# Patient Record
Sex: MALE | Race: WHITE | NOT HISPANIC OR LATINO | Employment: UNEMPLOYED | URBAN - METROPOLITAN AREA
[De-identification: names, ages, dates, MRNs, and addresses within clinical notes are randomized per-mention and may not be internally consistent; named-entity substitution may affect disease eponyms.]

---

## 2017-02-09 DIAGNOSIS — Q23.4 HYPOPLASTIC LEFT HEART SYNDROME: Primary | ICD-10-CM

## 2017-02-09 RX ORDER — DIGOXIN 125 MCG
125 TABLET ORAL 2 TIMES DAILY
Qty: 60 TABLET | Refills: 2 | Status: SHIPPED | OUTPATIENT
Start: 2017-02-09 | End: 2017-03-20

## 2017-02-09 RX ORDER — LISINOPRIL 10 MG/1
10 TABLET ORAL DAILY
Qty: 30 TABLET | Refills: 2 | Status: SHIPPED | OUTPATIENT
Start: 2017-02-09 | End: 2017-03-20

## 2017-03-20 ENCOUNTER — OFFICE VISIT (OUTPATIENT)
Dept: PEDIATRIC CARDIOLOGY | Facility: CLINIC | Age: 22
End: 2017-03-20

## 2017-03-20 VITALS
HEIGHT: 65 IN | SYSTOLIC BLOOD PRESSURE: 107 MMHG | WEIGHT: 104.94 LBS | RESPIRATION RATE: 18 BRPM | DIASTOLIC BLOOD PRESSURE: 62 MMHG | HEART RATE: 89 BPM | OXYGEN SATURATION: 92 % | BODY MASS INDEX: 17.48 KG/M2

## 2017-03-20 DIAGNOSIS — Q23.4 HYPOPLASTIC LEFT HEART SYNDROME: ICD-10-CM

## 2017-03-20 LAB — INTERPRETATION ECG - MUSE: NORMAL

## 2017-03-20 RX ORDER — DIGOXIN 125 MCG
125 TABLET ORAL 2 TIMES DAILY
Qty: 60 TABLET | Refills: 11 | Status: SHIPPED | OUTPATIENT
Start: 2017-03-20 | End: 2018-03-19

## 2017-03-20 RX ORDER — LISINOPRIL 10 MG/1
15 TABLET ORAL DAILY
Qty: 45 TABLET | Refills: 11 | Status: SHIPPED | OUTPATIENT
Start: 2017-03-20 | End: 2018-01-31

## 2017-03-20 ASSESSMENT — PAIN SCALES - GENERAL: PAINLEVEL: NO PAIN (0)

## 2017-03-20 NOTE — NURSING NOTE
"Chief Complaint   Patient presents with     Heart Problem     Follow-up on Single Ventricle and Fontan.       Initial /62 (BP Location: Right arm, Patient Position: Chair, Cuff Size: Adult Regular)  Pulse 89  Resp 18  Ht 5' 4.96\" (165 cm)  Wt 104 lb 15 oz (47.6 kg)  SpO2 92%  BMI 17.48 kg/m2 Estimated body mass index is 17.48 kg/(m^2) as calculated from the following:    Height as of this encounter: 5' 4.96\" (165 cm).    Weight as of this encounter: 104 lb 15 oz (47.6 kg).  Medication Reconciliation: complete  "

## 2017-03-20 NOTE — PROGRESS NOTES
[facility] Clinic Note             Assessment and Plan:     Carlin is a 21 year old male with     IMP: Carlin Stern is a 20 year old male with history of HLHS, s/p Non-Fenestrated Fontan procedure with developmental delay, currently asymptomatic, growing well.  Nose bleeds- he picks on his nose.   O 2 sat 92%.    PLAN:    F/U in July 2018 with O 2 sat, Echo, EKG  No Activity Restrictions  Adherence to heart healthy diet, regular exercise habits, avoidance of tobacco products and maintenance of a healthy weight  Continue     Aspirin 81 mg once daily    Digoxin 125 mcg BID    Lisinopril 15 mg once daily  - SBE Prophylaxis needed for dental procedures  - Results were reviewed with the family.      Patient Active Problem List   Diagnosis     Neuromuscular scoliosis of thoracolumbar region       Patient Active Problem List    Diagnosis     Neuromuscular scoliosis of thoracolumbar region             Attending Attestation:     Echocardiographic images were reviewed by me.           History of Present Illness:   I was asked to see this patient by Primary Care Provider Basilio Rahman to consult regarding single ventricle.  s/p Meagan, and s/p Non-fenestrated Fontan procedure.   Carlin was last seen on /2015. He has undergone a Me[place], Jamie and Fontan procedures at [facility] in [place]. Carlin was adopted at the age of [age] and since the last follow-up visit, according to the parents, he has done well.     He  underwent MRI on 4/8/15 which showed mild narrowing of the proximal LPA segment with pulmonary distribution of 45% to rt. Lung and 55% to left lung. With dilated RV - EDV 80ml/m2 with moderate hypertrophy and EF of 38%.  Last treadmill test performed on 2/13/13: Normal ventilatory effort, Peak Vo2 low 61% of predicted. Low peak heart rate -indicating chronotropic incompetence. Reduced cardiac capacity.  He is developmentally delayed. He  functions at the 3rd grade level although he is a alex currently in special Ed school. His hearing is good. His left eye vision is abnormal. His medications include Digoxin 125 micrograms twice daily, aspirin 81 mg once daily and Lisinopril 10 mg once daily. He has been active, can run and play with his friends, with no major illnesses or concerns. No major hospitalizations recently.His appetite is normal, sleeps well.     Last Echocardiogram - 2015, Status post [place] and Fontan procedures in a patient with hypoplastic left heart syndrome. Mild neoaortic insufficiency. Mild to moderate (2+) tricuspid insufficiency. The flow in the branch pulmonary arteries was not well seen. There is no evidence of fenestration. Normal/low normal systemic ventricular contractility.        Cardiac cath: Jan 2016, RV pressure 83/9, RV sat 95%, Fontan pressure 11 mm Hg, normal PVR and cardiac output. Fontan O 2 sat 73%. Normal flows across the branch pulmonary arteries.      Lab: Dec 2015, Hb- 13.9, pro BNP- 241, Troponin- less than 0.015, S.Cret 1.    I have reviewed past medical family and social history with the patient or family.    Past Medical History:   No Recent Hospitalizations  Hypoplastic left heart syndrome  S/p [place], Jamie, Non-fenestrated Fontan procedure  Developmental delay       Family and Social History:   No history of congenital heart disease  Non-contributory         Review of Systems:   A comprehensive Review of Systems was performed is negative other than noted in the HPI  CV and Pulm ROS  are neg  No RIOS, sob, cyanosis, edema, cough, wheeze, syncope, chest pain, palpitations          Medications:   I have reviewed this patient's current medications        Current Outpatient Prescriptions   Medication     digoxin (LANOXIN) 125 MCG tablet     lisinopril (PRINIVIL/ZESTRIL) 10 MG tablet     FLUoxetine (PROZAC) 10 MG capsule     aspirin 81 MG tablet     No current facility-administered medications for this  "visit.          Physical Exam:     Blood pressure 107/62, pulse 89, resp. rate 18, height 5' 4.96\" (165 cm), weight 104 lb 15 oz (47.6 kg), SpO2 92 %.        General - NAD, awake, alert   HEENT - NC/AT EOMI   Cardiac - RRR nl S1 and S2 heard, systolic murmur grade 1/6 LSB. No diastolic murmur No click, thrill or heave   Respiratory - Lungs clear, no rales   Abdominal - Liver at RCM   Extremity  Nl pulses in brachial and femoral areas, No Clubbing, Edema, Cyanosis   Skin - No rash   Neuro - Nl gait, posture, tone         Labs     EKG results:         EKG with today's visit V-rate of 82/min, sinus, RBBB.      Echocardiography today:  Results: Hypoplastic left heart syndrome. Patient has undergone [place], Jamie and Fontan operations. There is a large muscular ventricular septal defect. There is moderate right ventricular enlargement. The right ventricular function is qualitatively mildly depressed. Hypoplastic left ventricle. There is low velocity continuous color flow in the Jamie shunt. No inferior vena cava obstruction. Mild to moderate (2+) tricuspid valve insufficiency. There is trace insufficiency of the dejan-aortic valve. 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 There is normal mitral valve leaflet excursion.      Sincerely,    Shabnam Zee MD, AMEE  Pediatric Cardiologist    CC:   Copy to patient  KAITLINROB James  [address]  [facility] [address]  "

## 2017-03-20 NOTE — LETTER
3/20/2017      RE: Carlin Stern  [address]  [facility] [address]       [facility] Clinic Note             Assessment and Plan:     Carlin is a 21 year old male with     IMP: Carlin Stern is a 20 year old male with history of HLHS, s/p Non-Fenestrated Fontan procedure with developmental delay, currently asymptomatic, growing well.  Nose bleeds- he picks on his nose.   O 2 sat 92%.    PLAN:    F/U in July 2018 with O 2 sat, Echo, EKG  No Activity Restrictions  Adherence to heart healthy diet, regular exercise habits, avoidance of tobacco products and maintenance of a healthy weight  Continue     Aspirin 81 mg once daily    Digoxin 125 mcg BID    Lisinopril 15 mg once daily  - SBE Prophylaxis needed for dental procedures  - Results were reviewed with the family.      Patient Active Problem List   Diagnosis     Neuromuscular scoliosis of thoracolumbar region       Patient Active Problem List    Diagnosis     Neuromuscular scoliosis of thoracolumbar region             Attending Attestation:     Echocardiographic images were reviewed by me.           History of Present Illness:   I was asked to see this patient by Primary Care Provider Basilio Rahman to consult regarding single ventricle.  s/p Meagan, and s/p Non-fenestrated Fontan procedure.   Carlin was last seen on /2015. He has undergone a Me[place], Jamie and Fontan procedures at [facility] in [place]. Carlin was adopted at the age of [age] and since the last follow-up visit, according to the parents, he has done well.     He  underwent MRI on 4/8/15 which showed mild narrowing of the proximal LPA segment with pulmonary distribution of 45% to rt. Lung and 55% to left lung. With dilated RV - EDV 80ml/m2 with moderate hypertrophy and EF of 38%.  Last treadmill test performed on 2/13/13: Normal ventilatory effort, Peak Vo2 low 61% of predicted. Low peak heart rate -indicating  chronotropic incompetence. Reduced cardiac capacity.  He is developmentally delayed. He functions at the 3rd grade level although he is a alex currently in special Ed school. His hearing is good. His left eye vision is abnormal. His medications include Digoxin 125 micrograms twice daily, aspirin 81 mg once daily and Lisinopril 10 mg once daily. He has been active, can run and play with his friends, with no major illnesses or concerns. No major hospitalizations recently.His appetite is normal, sleeps well.     Last Echocardiogram - 2015, Status post [place] and Fontan procedures in a patient with hypoplastic left heart syndrome. Mild neoaortic insufficiency. Mild to moderate (2+) tricuspid insufficiency. The flow in the branch pulmonary arteries was not well seen. There is no evidence of fenestration. Normal/low normal systemic ventricular contractility.        Cardiac cath: Jan 2016, RV pressure 83/9, RV sat 95%, Fontan pressure 11 mm Hg, normal PVR and cardiac output. Fontan O 2 sat 73%. Normal flows across the branch pulmonary arteries.      Lab: Dec 2015, Hb- 13.9, pro BNP- 241, Troponin- less than 0.015, S.Cret 1.    I have reviewed past medical family and social history with the patient or family.    Past Medical History:   No Recent Hospitalizations  Hypoplastic left heart syndrome  S/p [place], Jamie, Non-fenestrated Fontan procedure  Developmental delay       Family and Social History:   No history of congenital heart disease  Non-contributory         Review of Systems:   A comprehensive Review of Systems was performed is negative other than noted in the HPI  CV and Pulm ROS  are neg  No RIOS, sob, cyanosis, edema, cough, wheeze, syncope, chest pain, palpitations          Medications:   I have reviewed this patient's current medications        Current Outpatient Prescriptions   Medication     digoxin (LANOXIN) 125 MCG tablet     lisinopril (PRINIVIL/ZESTRIL) 10 MG tablet     FLUoxetine (PROZAC) 10 MG  "capsule     aspirin 81 MG tablet     No current facility-administered medications for this visit.          Physical Exam:     Blood pressure 107/62, pulse 89, resp. rate 18, height 5' 4.96\" (165 cm), weight 104 lb 15 oz (47.6 kg), SpO2 92 %.        General - NAD, awake, alert   HEENT - NC/AT EOMI   Cardiac - RRR nl S1 and S2 heard, systolic murmur grade 1/6 LSB. No diastolic murmur No click, thrill or heave   Respiratory - Lungs clear, no rales   Abdominal - Liver at RCM   Extremity  Nl pulses in brachial and femoral areas, No Clubbing, Edema, Cyanosis   Skin - No rash   Neuro - Nl gait, posture, tone         Labs     EKG results:         EKG with today's visit V-rate of 82/min, sinus, RBBB.      Echocardiography today:  Results: Hypoplastic left heart syndrome. Patient has undergone [place], Jamie and Fontan operations. There is a large muscular ventricular septal defect. There is moderate right ventricular enlargement. The right ventricular function is qualitatively mildly depressed. Hypoplastic left ventricle. There is low velocity continuous color flow in the Jamie shunt. No inferior vena cava obstruction. Mild to moderate (2+) tricuspid valve insufficiency. There is trace insufficiency of the dejan-aortic valve. 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 There is normal mitral valve leaflet excursion.      Sincerely,    Shabnam Zee MD, [facility] Center[facility]  Pediatric Cardiologist    CC:   Copy to patient  ROB MADRIGAL James  [address]  [id number]    "

## 2017-03-20 NOTE — MR AVS SNAPSHOT
After Visit Summary   3/20/2017    Carlin Stern    MRN: [id number]           Patient Information     Date Of Birth          1995        Visit Information        Provider Department      3/20/2017 3:30 PM Shabnam Zee MD [facility]        Today's Diagnoses     Hypoplastic left heart syndrome          Care Instructions    [facility]  Pediatric Specialty Clinic [place]      Pediatric Call Center [phone]  Kerline Tovar RN Care Coordinator:  [phone]    After Hours Emergency:  [phone].  Ask for the on-call doctor for the specialty you are calling for be paged.    Prescription Renewals:  Your pharmacy must fax requests to [phone].  Please allow 2-3 days for prescriptions to be authorized.    If your physician has ordered an x-ray or MRI, you may schedule this test by calling [facility]iology in [place] at [phone].          Follow-ups after your visit        Your next 10 appointments already scheduled     Mar 20, 2017  3:30 PM CDT   Return Visit with Shabnam Zee MD   [facility] ([facility])    [address] [place] [address]  [address]  [facility] [address] [phone]              Who to contact     Please call your clinic at [phone] to:    Ask questions about your health    Make or cancel appointments    Discuss your medicines    Learn about your test results    Speak to your doctor   If you have compliments or concerns about an experience at your clinic, or if you wish to file a complaint, please contact [facility] Patient Relations at [phone] or email us at [email or web address]         Additional Information About Your Visit        MyChart Information     [employer] is an electronic gateway that provides easy, online access to your medical records. With [employer], you can request a clinic  "appointment, read your test results, renew a prescription or communicate with your care team.     To sign up for [employer] visit the website at [email or web address]   You will be asked to enter the access code listed below, as well as some personal information. Please follow the directions to create your username and password.     Your access code is: [email or web address]  Expires: 2017  3:07 PM     Your access code will  in 90 days. If you need help or a new code, please contact your [facility] or call [phone] for assistance.        Care EveryWhere ID     This is your Care EveryWhere ID. This could be used by other organizations to access your [place] medical records  [id number]        Your Vitals Were     Pulse Respirations Height Pulse Oximetry BMI (Body Mass Index)       89 18 5' 4.96\" (165 cm) 92% 17.48 kg/m2        Blood Pressure from Last 3 Encounters:   17 107/62   16 113/58   12/07/15 96/67    Weight from Last 3 Encounters:   17 104 lb 15 oz (47.6 kg) (<1 %)*   16 102 lb 14.4 oz (46.7 kg) (<1 %)*   16 105 lb 13.1 oz (48 kg) (<1 %)*     * Growth percentiles are based on [facility] 2-20 Years data.              We Performed the Following     EKG 12-lead complete w/read - Same Day          Today's Medication Changes          These changes are accurate as of: 3/20/17  3:07 PM.  If you have any questions, ask your nurse or doctor.               These medicines have changed or have updated prescriptions.        Dose/Directions    digoxin 125 MCG tablet   Commonly known as:  LANOXIN   This may have changed:  additional instructions   Used for:  Hypoplastic left heart syndrome        Dose:  125 mcg   Take 1 tablet (125 mcg) by mouth 2 times daily Must see Dr. Haque for additional refills   Quantity:  60 tablet   Refills:  2                Primary Care Provider Office Phone # Fax #    Basilio Rahman [phone]       [facility] [address] [address]        Thank you!     Thank you for choosing [facility]  for your care. Our goal is always to provide you with excellent care. Hearing back from our patients is one way we can continue to improve our services. Please take a few minutes to complete the written survey that you may receive in the mail after your visit with us. Thank you!             Your Updated Medication List - Protect others around you: Learn how to safely use, store and throw away your medicines at www.disposemymeds.org.          This list is accurate as of: 3/20/17  3:07 PM.  Always use your most recent med list.                   Brand Name Dispense Instructions for use    aspirin 81 MG tablet     60 tablet    Take 1 tablet (81 mg) by mouth daily       digoxin 125 MCG tablet    LANOXIN    60 tablet    Take 1 tablet (125 mcg) by mouth 2 times daily Must see Dr. Haque for additional refills       lisinopril 10 MG tablet    PRINIVIL/ZESTRIL    30 tablet    Take 1 tablet (10 mg) by mouth daily Must see Dr. Haque for additional refills       PROZAC 10 MG capsule   Generic drug:  FLUoxetine      Take 20 mg by mouth daily

## 2017-03-20 NOTE — PATIENT INSTRUCTIONS
[facility]  Pediatric Specialty Clinic [place]      Pediatric Call Center [phone]  Kerline Tovar RN Care Coordinator:  [phone]    After Hours Emergency:  [phone].  Ask for the on-call doctor for the specialty you are calling for be paged.    Prescription Renewals:  Your pharmacy must fax requests to [phone].  Please allow 2-3 days for prescriptions to be authorized.    If your physician has ordered an x-ray or MRI, you may schedule this test by calling [facility] in [place] at [phone].

## 2017-07-18 ENCOUNTER — CARE COORDINATION (OUTPATIENT)
Dept: PEDIATRIC CARDIOLOGY | Facility: CLINIC | Age: 22
End: 2017-07-18

## 2017-07-18 NOTE — PROGRESS NOTES
Received a request from [employer] to send in a prior authorization for digoxin.  Sent in the required paperwork and it was approved as of 7/18/17.

## 2018-01-31 DIAGNOSIS — Q23.4 HYPOPLASTIC LEFT HEART SYNDROME: ICD-10-CM

## 2018-01-31 RX ORDER — LISINOPRIL 10 MG/1
15 TABLET ORAL DAILY
Qty: 45 TABLET | Refills: 6 | Status: SHIPPED | OUTPATIENT
Start: 2018-01-31 | End: 2018-03-19

## 2018-02-28 ENCOUNTER — TELEPHONE (OUTPATIENT)
Dept: PEDIATRIC CARDIOLOGY | Facility: CLINIC | Age: 23
End: 2018-02-28

## 2018-02-28 DIAGNOSIS — Q23.4 HYPOPLASTIC LEFT HEART SYNDROME: Primary | ICD-10-CM

## 2018-02-28 NOTE — TELEPHONE ENCOUNTER
Called and left a message.  Dr. Haque would like to see Carlin back in clinic in March, prior to 4/1 when his insurance does not allow him to come here.  Mom should call their insurance and see if they cover Adult Congenital Cardiology Providers at [facility] and if not, what other providers are covered for him.  She should bring that information to their upcoming appt. Dr. Haque would like an echo and ekg at that appointment.     Left the scheduling number as well as the nurse triage number if they have any other questions or concerns.    Kerline Tovar RN Care Coordinator  [facility]

## 2018-02-28 NOTE — TELEPHONE ENCOUNTER
----- Message from Jeannette Evangelista RN sent at 2/27/2018 12:51 PM CST -----  Regarding: FW: referral      ----- Message -----     From: Darcy Monson     Sent: 2/27/2018  12:45 PM       To: p Peds Cardiology Rn Team - [facility]  Subject: referral                                         Is an  Needed: no  If yes, Which Language:    Callers Name: Neo Summers Phone Number: [phone]  Relationship to Patient: mom  Best time of day to call: any  Is it ok to leave a detailed voicemail on this number: yes  Reason for Call: as of 4-1 we are not longer accepting this pt's insurance. Can you please advise for another provider who would be a good recommendation. Please reach out.

## 2018-03-19 ENCOUNTER — OFFICE VISIT (OUTPATIENT)
Dept: PEDIATRIC CARDIOLOGY | Facility: CLINIC | Age: 23
End: 2018-03-19
Payer: MEDICARE

## 2018-03-19 ENCOUNTER — RADIANT APPOINTMENT (OUTPATIENT)
Dept: CARDIOLOGY | Facility: CLINIC | Age: 23
End: 2018-03-19
Payer: MEDICARE

## 2018-03-19 VITALS
BODY MASS INDEX: 17.92 KG/M2 | HEART RATE: 80 BPM | SYSTOLIC BLOOD PRESSURE: 103 MMHG | DIASTOLIC BLOOD PRESSURE: 56 MMHG | WEIGHT: 107.58 LBS | OXYGEN SATURATION: 97 % | HEIGHT: 65 IN

## 2018-03-19 DIAGNOSIS — Q23.4 HYPOPLASTIC LEFT HEART SYNDROME: ICD-10-CM

## 2018-03-19 LAB — INTERPRETATION ECG - MUSE: NORMAL

## 2018-03-19 RX ORDER — DIGOXIN 125 MCG
125 TABLET ORAL 2 TIMES DAILY
Qty: 60 TABLET | Refills: 11 | Status: SHIPPED | OUTPATIENT
Start: 2018-03-19 | End: 2019-03-06

## 2018-03-19 RX ORDER — LISINOPRIL 10 MG/1
15 TABLET ORAL DAILY
Qty: 45 TABLET | Refills: 6 | Status: SHIPPED | OUTPATIENT
Start: 2018-03-19 | End: 2019-02-12

## 2018-03-19 ASSESSMENT — PAIN SCALES - GENERAL: PAINLEVEL: NO PAIN (0)

## 2018-03-19 NOTE — LETTER
3/19/2018      RE: Carlin Stern  [address]  [facility] [address]       [facility] Note             Assessment and Plan:     Carlin is a 22 year old male with     IMP: Carlin Stern is a 22 year old male with history of HLHS, s/p Non-Fenestrated Fontan procedure with developmental delay, currently asymptomatic, growing well. No concerns.  O 2 sat 97%.    PLAN:    F/U in July 2019 with O 2 sat, Echo, EKG  No Activity Restrictions  Adherence to heart healthy diet, regular exercise habits, avoidance of tobacco products and maintenance of a healthy weight  Continue     Aspirin 81 mg once daily    Digoxin 125 mcg BID    Lisinopril 15 mg once daily  - SBE Prophylaxis needed for dental procedures  - Results were reviewed with the family.      Patient Active Problem List   Diagnosis     Neuromuscular scoliosis of thoracolumbar region       Patient Active Problem List    Diagnosis     Neuromuscular scoliosis of thoracolumbar region             Attending Attestation:     Echocardiographic images were reviewed by me.           History of Present Illness:   I was asked to see this patient by Primary Care Provider Basilio Rahman to consult regarding single ventricle.  s/p [place], and s/p Non-fenestrated Fontan procedure.   Carlin was last seen on /2015. He has undergone a [place], Jamie and Fontan procedures at [facility] in [place]. Carlin was adopted at the age of [age] and since the last follow-up visit, according to the parents, he has done well.     He  underwent MRI on 4/8/15 which showed mild narrowing of the proximal LPA segment with pulmonary distribution of 45% to rt. Lung and 55% to left lung. With dilated RV - EDV 80ml/m2 with moderate hypertrophy and EF of 38%.  Last treadmill test performed on 2/13/13: Normal ventilatory effort, Peak Vo2 low 61% of predicted. Low peak heart rate -indicating chronotropic incompetence.  Reduced cardiac capacity.  He is developmentally delayed. He functions at the 3rd grade level although he is a senior currently in special Ed school. His hearing is good. His left eye vision is abnormal. His medications include Digoxin 125 micrograms twice daily, aspirin 81 mg once daily and Lisinopril 10 mg once daily. He has been active, can run and play with his friends, with no major illnesses or concerns. No major hospitalizations recently.His appetite is normal, sleeps well.     Last Echocardiogram - Results: Hypoplastic left heart syndrome. Patient has undergone [place], Jamie and Fontan operations. There is a large muscular ventricular septal defect. There is moderate right ventricular enlargement. The right ventricular function is qualitatively mildly depressed. Hypoplastic left ventricle. There is low velocity continuous color flow in the Jamie shunt. No inferior vena cava obstruction. Mild to moderate (2+) tricuspid valve insufficiency. There is trace insufficiency of the dejan-aortic valve. 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 There is normal mitral valve leaflet excursion.     Cardiac cath: Jan 2016, RV pressure 83/9, RV sat 95%, Fontan pressure 11 mm Hg, normal PVR and cardiac output. Fontan O 2 sat 73%. Normal flows across the branch pulmonary arteries.      Lab: Dec 2015, Hb- 13.9, pro BNP- 241, Troponin- less than 0.015, S.Cret 1.    I have reviewed past medical family and social history with the patient or family.    Past Medical History:   No Recent Hospitalizations  Hypoplastic left heart syndrome  S/p [place], Jamie, Non-fenestrated Fontan procedure  Developmental delay       Family and Social History:   No history of congenital heart disease  Non-contributory         Review of Systems:   A comprehensive Review of Systems was performed is negative other than noted in the HPI  CV and Pulm ROS  are neg  No RIOS, sob, cyanosis, edema, cough, wheeze, syncope, chest pain, palpitations           "Medications:   I have reviewed this patient's current medications        Current Outpatient Prescriptions   Medication     sertraline (ZOLOFT) 50 MG tablet     lisinopril (PRINIVIL/ZESTRIL) 10 MG tablet     digoxin (LANOXIN) 125 MCG tablet     aspirin 81 MG tablet     No current facility-administered medications for this visit.          Physical Exam:     Blood pressure 103/56, pulse 80, height 1.66 m (5' 5.35\"), weight 48.8 kg (107 lb 9.4 oz), SpO2 97 %.        General - NAD, awake, alert   HEENT - NC/AT EOMI   Cardiac - RRR nl S1 and S2 heard, systolic murmur grade 1/6 LSB. Diastolic murmur grade 1/6 No click, thrill or heave   Respiratory - Lungs clear, no rales   Abdominal - Liver at RCM   Extremity  Nl pulses in brachial and femoral areas, No Clubbing, Edema, Cyanosis   Skin - No rash   Neuro - Nl gait, posture, tone         Labs     EKG results:         EKG with today's visit V-rate of 72/min, sinus, RBBB.  msec.      Echocardiography today:Hypoplastic left heart syndrome. Patient has undergone [place], Jamie and Fontan operations.There is a large muscular ventricular septal defect. There is moderate right ventricular enlargement. The right ventricular function is qualitatively mildly depressed. Hypoplastic left ventricle. There is low velocity continuous color flow in the Jamie shunt. No inferior vena cava obstruction. Mild to moderate (2+) tricuspid valve insufficiency. There is trace insufficiency of the dejan-aortic valve. 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 There is normal mitral valve leaflet excursion.      Sincerely,    Shabnam Zee MD, [facility]  Pediatric Cardiologist      Copy to patient  KAITLINROB James  [address]  [facility] [address]      "

## 2018-03-19 NOTE — PROGRESS NOTES
[facility] Note             Assessment and Plan:     Carlin is a 22 year old male with     IMP: Carlin Stern is a 22 year old male with history of HLHS, s/p Non-Fenestrated Fontan procedure with developmental delay, currently asymptomatic, growing well. No concerns.  O 2 sat 97%.    PLAN:    F/U in July 2019 with O 2 sat, Echo, EKG  No Activity Restrictions  Adherence to heart healthy diet, regular exercise habits, avoidance of tobacco products and maintenance of a healthy weight  Continue     Aspirin 81 mg once daily    Digoxin 125 mcg BID    Lisinopril 15 mg once daily  - SBE Prophylaxis needed for dental procedures  - Results were reviewed with the family.      Patient Active Problem List   Diagnosis     Neuromuscular scoliosis of thoracolumbar region       Patient Active Problem List    Diagnosis     Neuromuscular scoliosis of thoracolumbar region             Attending Attestation:     Echocardiographic images were reviewed by me.           History of Present Illness:   I was asked to see this patient by Primary Care Provider Basilio Rahman to consult regarding single ventricle.  s/p [place], and s/p Non-fenestrated Fontan procedure.   Carlin was last seen on /2015. He has undergone a [place], Jamie and Fontan procedures at [facility] in [place]. Carlin was adopted at the age of [age] and since the last follow-up visit, according to the parents, he has done well.     He  underwent MRI on 4/8/15 which showed mild narrowing of the proximal LPA segment with pulmonary distribution of 45% to rt. Lung and 55% to left lung. With dilated RV - EDV 80ml/m2 with moderate hypertrophy and EF of 38%.  Last treadmill test performed on 2/13/13: Normal ventilatory effort, Peak Vo2 low 61% of predicted. Low peak heart rate -indicating chronotropic incompetence. Reduced cardiac capacity.  He is developmentally delayed. 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 His hearing is good. His left eye vision is abnormal. His medications include Digoxin 125 micrograms twice daily, aspirin 81 mg once daily and Lisinopril 10 mg once daily. He has been active, can run and play with his friends, with no major illnesses or concerns. No major hospitalizations recently.His appetite is normal, sleeps well.     Last Echocardiogram - Results: Hypoplastic left heart syndrome. Patient has undergone [place], Jamie and Fontan operations. There is a large muscular ventricular septal defect. There is moderate right ventricular enlargement. The right ventricular function is qualitatively mildly depressed. Hypoplastic left ventricle. There is low velocity continuous color flow in the Jamie shunt. No inferior vena cava obstruction. Mild to moderate (2+) tricuspid valve insufficiency. There is trace insufficiency of the dejan-aortic valve. 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 There is normal mitral valve leaflet excursion.     Cardiac cath: Jan 2016, RV pressure 83/9, RV sat 95%, Fontan pressure 11 mm Hg, normal PVR and cardiac output. Fontan O 2 sat 73%. Normal flows across the branch pulmonary arteries.      Lab: Dec 2015, Hb- 13.9, pro BNP- 241, Troponin- less than 0.015, S.Cret 1.    I have reviewed past medical family and social history with the patient or family.    Past Medical History:   No Recent Hospitalizations  Hypoplastic left heart syndrome  S/p [place], Jamie, Non-fenestrated Fontan procedure  Developmental delay       Family and Social History:   No history of congenital heart disease  Non-contributory         Review of Systems:   A comprehensive Review of Systems was performed is negative other than noted in the HPI  CV and Pulm ROS  are neg  No RIOS, sob, cyanosis, edema, cough, wheeze, syncope, chest pain, palpitations          Medications:   I have reviewed this patient's current medications        Current Outpatient  "Prescriptions   Medication     sertraline (ZOLOFT) 50 MG tablet     lisinopril (PRINIVIL/ZESTRIL) 10 MG tablet     digoxin (LANOXIN) 125 MCG tablet     aspirin 81 MG tablet     No current facility-administered medications for this visit.          Physical Exam:     Blood pressure 103/56, pulse 80, height 1.66 m (5' 5.35\"), weight 48.8 kg (107 lb 9.4 oz), SpO2 97 %.        General - NAD, awake, alert   HEENT - NC/AT EOMI   Cardiac - RRR nl S1 and S2 heard, systolic murmur grade 1/6 LSB. Diastolic murmur grade 1/6 No click, thrill or heave   Respiratory - Lungs clear, no rales   Abdominal - Liver at RCM   Extremity  Nl pulses in brachial and femoral areas, No Clubbing, Edema, Cyanosis   Skin - No rash   Neuro - Nl gait, posture, tone         Labs     EKG results:         EKG with today's visit V-rate of 72/min, sinus, RBBB.  msec.      Echocardiography today:Hypoplastic left heart syndrome. Patient has undergone [place], Jamie and Fontan operations.There is a large muscular ventricular septal defect. There is moderate right ventricular enlargement. The right ventricular function is qualitatively mildly depressed. Hypoplastic left ventricle. There is low velocity continuous color flow in the [place] shunt. No inferior vena cava obstruction. Mild to moderate (2+) tricuspid valve insufficiency. There is trace insufficiency of the dejan-aortic valve. 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 There is normal mitral valve leaflet excursion.        Sincerely,    Shabnam Zee MD, [facility]  Pediatric Cardiologist    CC:   Copy to patient  ROB MADRIGAL James  [address]  [facility] [address]  "

## 2018-03-19 NOTE — MR AVS SNAPSHOT
"              After Visit Summary   3/19/2018    Carlin Stern    MRN: [id number]           Patient Information     Date Of Birth          1995        Visit Information        Provider Department      3/19/2018 12:30 PM Shabnam Zee MD [facility]        Today's Diagnoses     Hypoplastic left heart syndrome          Care Instructions    [facility]  Pediatric Specialty Clinic [place]      Pediatric Call Center [phone], option 1  Kerline Tovar RN Care Coordinator:  [phone]    After Hours Emergency:  [phone].  Ask for the on-call pediatric doctor for the specialty you are calling for be paged.    Prescription Renewals:  Your pharmacy must fax requests to [phone].  Please allow 2-3 days for prescriptions to be authorized.    If your physician has ordered an CT or MRI, you may schedule this test by calling [facility] in [place] at [phone].            Follow-ups after your visit        Follow-up notes from your care team     Return in about 16 months (around 7/15/2019) for Physical Exam, Echo, EKG, O 2 saturation.      Your next 10 appointments already scheduled     Mar 19, 2018 12:30 PM CDT   Return Visit with Shabnam Zee MD   [facility] ([facility])    [address] [place] [address]  [address]  [facility] [address] [phone]              Who to contact     Please call your clinic at [phone] to:    Ask questions about your health    Make or cancel appointments    Discuss your medicines    Learn about your test results    Speak to your doctor            Additional Information About Your Visit        Care EveryWhere ID     This is your Care EveryWhere ID. This could be used by other organizations to access your [place] medical records  [id number]        Your Vitals Were     Pulse Height Pulse Oximetry BMI (Body Mass Index)          80 5' 5.35\" " (166 cm) 97% 17.71 kg/m2         Blood Pressure from Last 3 Encounters:   03/19/18 103/56   03/20/17 107/62   03/11/16 113/58    Weight from Last 3 Encounters:   03/19/18 107 lb 9.4 oz (48.8 kg)   03/20/17 104 lb 15 oz (47.6 kg) (<1 %)*   04/07/16 102 lb 14.4 oz (46.7 kg) (<1 %)*     * Growth percentiles are based on [facility] 2-20 Years data.              We Performed the Following     EKG 12-lead complete w/ read - Future          Today's Medication Changes          These changes are accurate as of 3/19/18 12:15 PM.  If you have any questions, ask your nurse or doctor.               Stop taking these medicines if you haven't already. Please contact your care team if you have questions.     PROZAC 10 MG capsule   Generic drug:  FLUoxetine   Stopped by:  Shabnam Zee MD                    Primary Care Provider Office Phone # Fax #    Basilio Rahman [phone]       [address] [address]        Equal Access to Services     [facility]: Hadii garcía Mann, caleb bonilla, thaddeus saba, gerardo pruitt . So [facility] [phone].    ATENCIÓN: Si habla español, tiene a sumner disposición servicios gratuitos de asistencia lingüística. [facility] [phone].    We comply with applicable federal civil rights laws and [place] laws. We do not discriminate on the basis of race, color, national origin, age, disability, sex, sexual orientation, or gender identity.            Thank you!     Thank you for choosing [facility]  for your care. Our goal is always to provide you with excellent care. Hearing back from our patients is one way we can continue to improve our services. Please take a few minutes to complete the written survey that you may receive in the mail after your visit with us. Thank you!             Your Updated Medication List - Protect others around you: Learn how to  safely use, store and throw away your medicines at www.disposemymeds.org.          This list is accurate as of 3/19/18 12:15 PM.  Always use your most recent med list.                   Brand Name Dispense Instructions for use Diagnosis    aspirin 81 MG tablet     60 tablet    Take 1 tablet (81 mg) by mouth daily    Hypoplastic left heart syndrome       digoxin 125 MCG tablet    LANOXIN    60 tablet    Take 1 tablet (125 mcg) by mouth 2 times daily Must see Dr. Haque for additional refills    Hypoplastic left heart syndrome       lisinopril 10 MG tablet    PRINIVIL/ZESTRIL    45 tablet    Take 1.5 tablets (15 mg) by mouth daily    Hypoplastic left heart syndrome       sertraline 50 MG tablet    ZOLOFT     Take 75 mg by mouth

## 2018-03-19 NOTE — NURSING NOTE
"Chief Complaint   Patient presents with     Heart Problem     Follow up       Initial /56 (BP Location: Right arm, Patient Position: Sitting, Cuff Size: Adult Regular)  Pulse 80  Ht 5' 5.35\" (166 cm)  Wt 107 lb 9.4 oz (48.8 kg)  SpO2 97%  BMI 17.71 kg/m2 Estimated body mass index is 17.71 kg/(m^2) as calculated from the following:    Height as of this encounter: 5' 5.35\" (166 cm).    Weight as of this encounter: 107 lb 9.4 oz (48.8 kg).  Medication Reconciliation: complete    "

## 2018-03-19 NOTE — PATIENT INSTRUCTIONS
[facility]  Pediatric Specialty Clinic [place]      Pediatric Call Center [phone], option 1  Kerline Tovar RN Care Coordinator:  [phone]    After Hours Emergency:  [phone].  Ask for the on-call pediatric doctor for the specialty you are calling for be paged.    Prescription Renewals:  Your pharmacy must fax requests to [phone].  Please allow 2-3 days for prescriptions to be authorized.    If your physician has ordered an CT or MRI, you may schedule this test by calling [facility] in [place] at [phone].

## 2018-12-14 DIAGNOSIS — Q23.4 HLHS (HYPOPLASTIC LEFT HEART SYNDROME): Primary | ICD-10-CM

## 2018-12-14 DIAGNOSIS — Z98.890 S/P FONTAN PROCEDURE: ICD-10-CM

## 2019-02-12 DIAGNOSIS — Q23.4 HYPOPLASTIC LEFT HEART SYNDROME: ICD-10-CM

## 2019-02-12 RX ORDER — LISINOPRIL 10 MG/1
15 TABLET ORAL DAILY
Qty: 45 TABLET | Refills: 6 | Status: SHIPPED | OUTPATIENT
Start: 2019-02-12 | End: 2019-03-06

## 2019-02-12 NOTE — TELEPHONE ENCOUNTER
Refilled per nursing protocol..  Patient due back in July to see Dr. Haque.    Kerline Tovar, RN Care Coordinator  [place] [facility]

## 2019-03-06 DIAGNOSIS — Q23.4 HYPOPLASTIC LEFT HEART SYNDROME: ICD-10-CM

## 2019-03-06 RX ORDER — LISINOPRIL 10 MG/1
15 TABLET ORAL DAILY
Qty: 45 TABLET | Refills: 4 | Status: SHIPPED | OUTPATIENT
Start: 2019-03-06 | End: 2019-08-26

## 2019-03-06 RX ORDER — DIGOXIN 125 MCG
125 TABLET ORAL 2 TIMES DAILY
Qty: 60 TABLET | Refills: 4 | Status: SHIPPED | OUTPATIENT
Start: 2019-03-06 | End: 2019-07-31

## 2019-03-06 NOTE — TELEPHONE ENCOUNTER
Refilled per nursing protocol.  Carlin is due back to be seen by Dr. Haque in July 2019.    Kerline Tovar, RN Care Coordinator  [place] [facility]

## 2019-04-26 DIAGNOSIS — Q23.4 HYPOPLASTIC LEFT HEART SYNDROME: ICD-10-CM

## 2019-04-26 NOTE — TELEPHONE ENCOUNTER
Refilled per nursing protocol.  Patient is due to see Dr. Haque again in July 2019.    Kerline Tovar, RN Care Coordinator  [place] [facility]

## 2019-07-31 DIAGNOSIS — Q23.4 HYPOPLASTIC LEFT HEART SYNDROME: ICD-10-CM

## 2019-07-31 RX ORDER — DIGOXIN 125 MCG
125 TABLET ORAL 2 TIMES DAILY
Qty: 60 TABLET | Refills: 1 | Status: SHIPPED | OUTPATIENT
Start: 2019-07-31 | End: 2019-08-26

## 2019-07-31 NOTE — TELEPHONE ENCOUNTER
Patient scheduled to see Dr. Haque on 8/26/19.  Refilled per nursing protocol.    Kerline Tovar, RN Care Coordinator  [place] [facility]

## 2019-08-26 ENCOUNTER — OFFICE VISIT (OUTPATIENT)
Dept: PEDIATRIC CARDIOLOGY | Facility: CLINIC | Age: 24
End: 2019-08-26
Payer: MEDICARE

## 2019-08-26 ENCOUNTER — ANCILLARY PROCEDURE (OUTPATIENT)
Dept: CARDIOLOGY | Facility: CLINIC | Age: 24
End: 2019-08-26
Payer: MEDICARE

## 2019-08-26 VITALS
WEIGHT: 110.89 LBS | BODY MASS INDEX: 18.48 KG/M2 | HEIGHT: 65 IN | SYSTOLIC BLOOD PRESSURE: 111 MMHG | OXYGEN SATURATION: 96 % | HEART RATE: 73 BPM | DIASTOLIC BLOOD PRESSURE: 64 MMHG

## 2019-08-26 DIAGNOSIS — Q23.4 HLHS (HYPOPLASTIC LEFT HEART SYNDROME): ICD-10-CM

## 2019-08-26 DIAGNOSIS — Z98.890 S/P FONTAN PROCEDURE: ICD-10-CM

## 2019-08-26 DIAGNOSIS — Q23.4 HYPOPLASTIC LEFT HEART SYNDROME: ICD-10-CM

## 2019-08-26 LAB
ALBUMIN SERPL-MCNC: 4.6 G/DL (ref 3.4–5)
ALP SERPL-CCNC: 76 U/L (ref 40–150)
ALT SERPL W P-5'-P-CCNC: 28 U/L (ref 0–70)
ANION GAP SERPL CALCULATED.3IONS-SCNC: 9 MMOL/L (ref 3–14)
AST SERPL W P-5'-P-CCNC: 23 U/L (ref 0–45)
BASOPHILS # BLD AUTO: 0 10E9/L (ref 0–0.2)
BASOPHILS NFR BLD AUTO: 0.7 %
BILIRUB SERPL-MCNC: 0.5 MG/DL (ref 0.2–1.3)
BUN SERPL-MCNC: 25 MG/DL (ref 7–30)
CALCIUM SERPL-MCNC: 9.4 MG/DL (ref 8.5–10.1)
CHLORIDE SERPL-SCNC: 101 MMOL/L (ref 94–109)
CO2 SERPL-SCNC: 25 MMOL/L (ref 20–32)
CREAT SERPL-MCNC: 1.06 MG/DL (ref 0.66–1.25)
DIFFERENTIAL METHOD BLD: ABNORMAL
EOSINOPHIL # BLD AUTO: 0.2 10E9/L (ref 0–0.7)
EOSINOPHIL NFR BLD AUTO: 5.4 %
ERYTHROCYTE [DISTWIDTH] IN BLOOD BY AUTOMATED COUNT: 13.2 % (ref 10–15)
GFR SERPL CREATININE-BSD FRML MDRD: >90 ML/MIN/{1.73_M2}
GLUCOSE SERPL-MCNC: 84 MG/DL (ref 70–99)
HCT VFR BLD AUTO: 46.6 % (ref 40–53)
HGB BLD-MCNC: 15.1 G/DL (ref 13.3–17.7)
IMM GRANULOCYTES # BLD: 0 10E9/L (ref 0–0.4)
IMM GRANULOCYTES NFR BLD: 0.2 %
INTERPRETATION ECG - MUSE: NORMAL
LYMPHOCYTES # BLD AUTO: 1 10E9/L (ref 0.8–5.3)
LYMPHOCYTES NFR BLD AUTO: 23.2 %
MCH RBC QN AUTO: 28.9 PG (ref 26.5–33)
MCHC RBC AUTO-ENTMCNC: 32.4 G/DL (ref 31.5–36.5)
MCV RBC AUTO: 89 FL (ref 78–100)
MONOCYTES # BLD AUTO: 0.6 10E9/L (ref 0–1.3)
MONOCYTES NFR BLD AUTO: 14.2 %
NEUTROPHILS # BLD AUTO: 2.5 10E9/L (ref 1.6–8.3)
NEUTROPHILS NFR BLD AUTO: 56.3 %
NRBC # BLD AUTO: 0 10*3/UL
NRBC BLD AUTO-RTO: 0 /100
NT-PROBNP SERPL-MCNC: 306 PG/ML (ref 0–125)
PLATELET # BLD AUTO: 78 10E9/L (ref 150–450)
POTASSIUM SERPL-SCNC: 4.3 MMOL/L (ref 3.4–5.3)
PROT SERPL-MCNC: 8 G/DL (ref 6.8–8.8)
RBC # BLD AUTO: 5.22 10E12/L (ref 4.4–5.9)
SODIUM SERPL-SCNC: 136 MMOL/L (ref 133–144)
WBC # BLD AUTO: 4.4 10E9/L (ref 4–11)

## 2019-08-26 RX ORDER — DIGOXIN 250 MCG
250 TABLET ORAL 2 TIMES DAILY
Qty: 45 TABLET | Refills: 4 | Status: SHIPPED | OUTPATIENT
Start: 2019-08-26 | End: 2020-01-06

## 2019-08-26 RX ORDER — SERTRALINE HYDROCHLORIDE 100 MG/1
100 TABLET, FILM COATED ORAL DAILY
Refills: 2 | COMMUNITY
Start: 2019-04-01

## 2019-08-26 RX ORDER — LISINOPRIL 20 MG/1
20 TABLET ORAL DAILY
Qty: 45 TABLET | Refills: 4 | Status: SHIPPED | OUTPATIENT
Start: 2019-08-26 | End: 2020-04-02

## 2019-08-26 ASSESSMENT — PAIN SCALES - GENERAL: PAINLEVEL: NO PAIN (0)

## 2019-08-26 ASSESSMENT — MIFFLIN-ST. JEOR: SCORE: 1433

## 2019-08-26 NOTE — PROGRESS NOTES
[facility] Note             Assessment and Plan:     Carlin is a 24 year old male with     IMP: Carlin Stern is a 24 year old male with history of HLHS, s/p Non-Fenestrated Fontan procedure with developmental delay. Having episodes of acrocyanosis at bedtime but having unchanged exercise tolerance. Will need more advanced imaging to assess branch pulmonary arteries and given intermittent episodes of desaturation would draw labs to assess possible chronic desaturations. O2 sat 96%.    PLAN:    F/U in August 2020 with O2 sat, Echo, EKG  Will order CT angio to assess branch pulmonary arteries and possible collaterals  Anticipate catheterization for possible coiling of collaterals  No Activity Restrictions  Adherence to heart healthy diet, regular exercise habits, avoidance of tobacco products and maintenance of a healthy weight  Will adjust medications    Aspirin 81 mg once daily    Digoxin 250 mcg BID    Lisinopril 20 mg once daily  - SBE Prophylaxis needed for dental procedures  - Results were reviewed with the family.  - Will obtain Lytes, LFTs, CBC and BNP today - Reviewed the results and were within normal limits.      Patient Active Problem List   Diagnosis     Neuromuscular scoliosis of thoracolumbar region       Patient Active Problem List    Diagnosis     Neuromuscular scoliosis of thoracolumbar region             Attending Attestation:     Echocardiographic images were reviewed by me.    History of Present Illness:   I was asked to see this patient by Primary Care Provider Basilio Rahman to consult regarding single ventricle.  s/p Meagan, and s/p Non-fenestrated Fontan procedure.   Carlin was last seen on /2015. He has undergone a Meagan, Jamie and Fontan procedures at [facility] in [place]. Carlin was adopted at the age of [age] and since the last follow-up visit, according to the parents, he has done well.     He  underwent  MRI on 4/8/15 which showed mild narrowing of the proximal LPA segment with pulmonary distribution of 45% to rt. Lung and 55% to left lung. With dilated RV - EDV 80ml/m2 with moderate hypertrophy and EF of 38%.  Last treadmill test performed on 2/13/13: Normal ventilatory effort, Peak Vo2 low 61% of predicted. Low peak heart rate -indicating chronotropic incompetence. Reduced cardiac capacity.    He is developmentally delayed. His hearing is good. His left eye vision is abnormal. His medications include Digoxin 125 micrograms twice daily, aspirin 81 mg once daily and Lisinopril 15 mg once daily. 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 Parents report that he does have acrocyanosis in his fingers at dinner time daily. He has otherwise not had any shortness of breath, chest pain, syncope or pre-syncope. No other major illnesses or concerns. No major hospitalizations recently. His appetite is normal, sleeps well.     Last Echocardiogram 3/19/18- Results: Hypoplastic left heart syndrome. Patient has undergone Meagan, Jamie and Fontan operations.There is a large muscular ventricular septal defect. There is moderate right ventricular enlargement. The right ventricular function is qualitatively mildly depressed. Hypoplastic left ventricle. There is low velocity continuous color flow in the Jamie shunt. No inferior vena cava obstruction. Mild to moderate (2+) tricuspid valve insufficiency. There is trace insufficiency of the dejan-aortic valve. 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 There is normal mitral valve leaflet excursion.    Cardiac cath: Jan 2016, RV pressure 83/9, RV sat 95%, Fontan pressure 11 mm Hg, normal PVR and cardiac output. Fontan O 2 sat 73%. Normal flows across the branch pulmonary arteries.    Lab: Dec 2015, Hb- 13.9, pro BNP- 241, Troponin- less than 0.015, S.Cret 1.    Lab- 2019- Hb 15.1, BUN-25, cret- 1.06, pro     I have reviewed past medical family and social history  "with the patient or family.    Past Medical History:   No Recent Hospitalizations  Hypoplastic left heart syndrome  S/p Meagan, Jamie, Non-fenestrated Fontan procedure  Developmental delay       Family and Social History:   No history of congenital heart disease  Non-contributory         Review of Systems:   A comprehensive Review of Systems was performed is negative other than noted in the HPI  CV and Pulm ROS  are neg  No RIOS, sob, cyanosis, edema, cough, wheeze, syncope, chest pain, palpitations          Medications:   I have reviewed this patient's current medications    Current Outpatient Medications   Medication     aspirin (ASA) 81 MG tablet     digoxin (LANOXIN) 125 MCG tablet     lisinopril (PRINIVIL/ZESTRIL) 10 MG tablet     sertraline (ZOLOFT) 100 MG tablet     No current facility-administered medications for this visit.          Physical Exam:     Blood pressure 111/64, pulse 73, height 1.656 m (5' 5.2\"), weight 50.3 kg (110 lb 14.3 oz), SpO2 96 %.    General - NAD, awake, alert   HEENT - NC/AT EOMI   Cardiac - RRR nl S1 and S2 heard, systolic murmur grade 1/6 LSB. Diastolic murmur grade 2/4 No click, thrill or heave   Respiratory - Lungs clear, no rales   Abdominal - Liver at RCM   Extremity  Nl pulses in brachial and femoral areas, No Clubbing, Edema, Cyanosis   Skin - No rash   Neuro - Nl gait, posture, tone         Labs     EKG results:      EKG with today's visit V-rate of 70/min, sinus, RBBB.  msec.      Echocardiography today:  Hypoplastic left heart syndrome. Patient has undergone Meagan, Jamie and Fontan operations. Large muscular ventricular septal defect with bidirectional flow. 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 Qualitatively mild/moderately depressed right ventricular systolic function. Normal appearance and motion of the systemic tricuspid valve, with moderate central regurgitation. Low-velocity phasic flow in the superior vena " cava, superior cavopulmonary anastamosis, inferior vena cava, and proximal Fontan conduit; inferior cavopulmonary anastamosis and branch pulmonary arteries not well seen. Unobstructed neoaortic outflow with mild insufficiency. Aortic arch not well-seen on 2D; normal prograde flow without increased diastolic continuation, and normal abdominal aortic flow profile. No effusion.     No significant change from last echocardiogram.    Plan of care discussed with Dr. Anju Hodges MD  Fellow, Pediatric Cardiology  Pager: [phone]    Patient Education: During this visit I discussed in detail the patient s symptoms, physical exam and evaluation results findings, tentative diagnosis as well as the treatment plan (Including but not limited to possible side effects and complications related to the disease, treatment modalities and intervention(s). Family expressed understanding and consent. Family was receptive and ready to learn; no apparent learning barriers were identified.    Sincerely,    Nicci Rene MD, [facility]  Pediatric Cardiologist    CC:   Copy to patient  ROB MADRIGAL Basilio Madrigal  [address] [facility] [address]    Attestation:  This patient has been seen and evaluated by me, Nicci Rene MD.  Discussed with the medical student, house staff team and/or resident(s) and agree with the findings and plan in this note.  I have reviewed today's vital signs, medications, labs and imaging.  Nicci Rene MD

## 2019-08-26 NOTE — PATIENT INSTRUCTIONS
[facility]  Pediatric Specialty Clinic [place]      Pediatric Call Center [phone], option 1  Kerline Tovar RN Care Coordinator:  [phone]    After Hours Needing Immediate Care:  [phone].  Ask for the on-call pediatric doctor for the specialty you are calling for be paged.  For dermatology urgent matters that cannot wait until the next business day, is over a holiday and/or a weekend please call [phone] and ask for the Dermatology Resident On-Call to be paged.    Prescription Renewals:  Please call your pharmacy first.  Your pharmacy must fax requests to [phone].  Please allow 2-3 days for prescriptions to be authorized.    If your physician has ordered a CT or MRI, you may schedule this test by calling [facility] in [place] at [phone].    **If your child is having a sedated procedure, they will need a history and physical done at their Primary Care Provider within 30 days of the procedure.  If your child was seen by the ordering provider in our office within 30 days of the procedure, their visit summary will work for the H&P unless they inform you otherwise.  If you have any questions, please call the RN Care Coordinator.**

## 2019-08-26 NOTE — LETTER
8/26/2019      RE: Carlin Stern  [address]  [facility] [address]       [facility] Note             Assessment and Plan:     Carlin is a 24 year old male with     IMP: Carlin Stern is a 24 year old male with history of HLHS, s/p Non-Fenestrated Fontan procedure with developmental delay. Having episodes of acrocyanosis at bedtime but having unchanged exercise tolerance. Will need more advanced imaging to assess branch pulmonary arteries and given intermittent episodes of desaturation would draw labs to assess possible chronic desaturations. O2 sat 96%.    PLAN:    F/U in August 2020 with O2 sat, Echo, EKG  Will order CT angio to assess branch pulmonary arteries and possible collaterals  Anticipate catheterization for possible coiling of collaterals  No Activity Restrictions  Adherence to heart healthy diet, regular exercise habits, avoidance of tobacco products and maintenance of a healthy weight  Will adjust medications    Aspirin 81 mg once daily    Digoxin 250 mcg BID    Lisinopril 20 mg once daily  - SBE Prophylaxis needed for dental procedures  - Results were reviewed with the family.  - Will obtain Lytes, LFTs, CBC and BNP today - Reviewed the results and were within normal limits.      Patient Active Problem List   Diagnosis     Neuromuscular scoliosis of thoracolumbar region       Patient Active Problem List    Diagnosis     Neuromuscular scoliosis of thoracolumbar region             Attending Attestation:     Echocardiographic images were reviewed by me.    History of Present Illness:   I was asked to see this patient by Primary Care Provider Basilio Rahman to consult regarding single ventricle.  s/p [place], and s/p Non-fenestrated Fontan procedure.   Carlin was last seen on /2015. He has undergone a [place], Jamie and Fontan procedures at [facility] in [place]. Carlin was adopted at the age of [age] and since the  last follow-up visit, according to the parents, he has done well.     He  underwent MRI on 4/8/15 which showed mild narrowing of the proximal LPA segment with pulmonary distribution of 45% to rt. Lung and 55% to left lung. With dilated RV - EDV 80ml/m2 with moderate hypertrophy and EF of 38%.  Last treadmill test performed on 2/13/13: Normal ventilatory effort, Peak Vo2 low 61% of predicted. Low peak heart rate -indicating chronotropic incompetence. Reduced cardiac capacity.    He is developmentally delayed. His hearing is good. His left eye vision is abnormal. His medications include Digoxin 125 micrograms twice daily, aspirin 81 mg once daily and Lisinopril 15 mg once daily. 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 Parents report that he does have acrocyanosis in his fingers at dinner time daily. He has otherwise not had any shortness of breath, chest pain, syncope or pre-syncope. No other major illnesses or concerns. No major hospitalizations recently. His appetite is normal, sleeps well.     Last Echocardiogram 3/19/18- Results: Hypoplastic left heart syndrome. Patient has undergone Meagan, Jamie and Fontan operations.There is a large muscular ventricular septal defect. There is moderate right ventricular enlargement. The right ventricular function is qualitatively mildly depressed. Hypoplastic left ventricle. There is low velocity continuous color flow in the Jamie shunt. No inferior vena cava obstruction. Mild to moderate (2+) tricuspid valve insufficiency. There is trace insufficiency of the dejan-aortic valve. 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 There is normal mitral valve leaflet excursion.    Cardiac cath: Jan 2016, RV pressure 83/9, RV sat 95%, Fontan pressure 11 mm Hg, normal PVR and cardiac output. Fontan O 2 sat 73%. Normal flows across the branch pulmonary arteries.    Lab: Dec 2015, Hb- 13.9, pro BNP- 241, Troponin- less than 0.015, S.Cret 1.    Lab- 2019- Hb 15.1,  "BUN-25, cret- 1.06, pro     I have reviewed past medical family and social history with the patient or family.    Past Medical History:   No Recent Hospitalizations  Hypoplastic left heart syndrome  S/p [place], Jamie, Non-fenestrated Fontan procedure  Developmental delay       Family and Social History:   No history of congenital heart disease  Non-contributory         Review of Systems:   A comprehensive Review of Systems was performed is negative other than noted in the HPI  CV and Pulm ROS  are neg  No RIOS, sob, cyanosis, edema, cough, wheeze, syncope, chest pain, palpitations          Medications:   I have reviewed this patient's current medications    Current Outpatient Medications   Medication     aspirin (ASA) 81 MG tablet     digoxin (LANOXIN) 125 MCG tablet     lisinopril (PRINIVIL/ZESTRIL) 10 MG tablet     sertraline (ZOLOFT) 100 MG tablet     No current facility-administered medications for this visit.          Physical Exam:     Blood pressure 111/64, pulse 73, height 1.656 m (5' 5.2\"), weight 50.3 kg (110 lb 14.3 oz), SpO2 96 %.    General - NAD, awake, alert   HEENT - NC/AT EOMI   Cardiac - RRR nl S1 and S2 heard, systolic murmur grade 1/6 LSB. Diastolic murmur grade 2/4 No click, thrill or heave   Respiratory - Lungs clear, no rales   Abdominal - Liver at RCM   Extremity  Nl pulses in brachial and femoral areas, No Clubbing, Edema, Cyanosis   Skin - No rash   Neuro - Nl gait, posture, tone         Labs     EKG results:      EKG with today's visit V-rate of 70/min, sinus, RBBB.  msec.      Echocardiography today:  Hypoplastic left heart syndrome. Patient has undergone [place], Jamie and Fontan operations. Large muscular ventricular septal defect with bidirectional flow. 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 Qualitatively mild/moderately depressed right ventricular systolic function. Normal appearance and motion of the systemic tricuspid " valve, with moderate central regurgitation. Low-velocity phasic flow in the superior vena cava, superior cavopulmonary anastamosis, inferior vena cava, and proximal Fontan conduit; inferior cavopulmonary anastamosis and branch pulmonary arteries not well seen. Unobstructed neoaortic outflow with mild insufficiency. Aortic arch not well-seen on 2D; normal prograde flow without increased diastolic continuation, and normal abdominal aortic flow profile. No effusion.     No significant change from last echocardiogram.    Plan of care discussed with Dr. Anju Hodges MD  Fellow, Pediatric Cardiology  Pager: [phone]    Patient Education: During this visit I discussed in detail the patient s symptoms, physical exam and evaluation results findings, tentative diagnosis as well as the treatment plan (Including but not limited to possible side effects and complications related to the disease, treatment modalities and intervention(s). Family expressed understanding and consent. Family was receptive and ready to learn; no apparent learning barriers were identified.    Sincerely,    Nicci Rene MD, [facility]  Pediatric Cardiologist    CC:   Copy to patient  ARTHUR MADRIGALBasilio Matt  [address] [facility] [address]    Attestation:  This patient has been seen and evaluated by me, Nicci Rene MD.  Discussed with the medical student, house staff team and/or resident(s) and agree with the findings and plan in this note.  I have reviewed today's vital signs, medications, labs and imaging.  MD Nicci Sanchez MD

## 2019-08-26 NOTE — NURSING NOTE
"[facility] [address] [[id number]]  Chief Complaint   Patient presents with     Heart Problem     Follow-up on HLHS and Fontan.     Initial /64 (BP Location: Right arm, Patient Position: Sitting, Cuff Size: Adult Regular)   Pulse 73   Ht 1.656 m (5' 5.2\")   Wt 50.3 kg (110 lb 14.3 oz)   BMI 18.34 kg/m   Estimated body mass index is 18.34 kg/m  as calculated from the following:    Height as of this encounter: 1.656 m (5' 5.2\").    Weight as of this encounter: 50.3 kg (110 lb 14.3 oz).  Medication Reconciliation: complete    "

## 2019-09-03 ENCOUNTER — HOSPITAL ENCOUNTER (OUTPATIENT)
Dept: CT IMAGING | Facility: CLINIC | Age: 24
Discharge: HOME | End: 2019-09-03
Payer: MEDICARE

## 2019-09-03 DIAGNOSIS — Z98.890 S/P FONTAN PROCEDURE: ICD-10-CM

## 2019-09-03 DIAGNOSIS — Q23.4 HLHS (HYPOPLASTIC LEFT HEART SYNDROME): ICD-10-CM

## 2019-09-03 PROCEDURE — 25000128 H RX IP 250 OP 636: Performed by: PEDIATRICS

## 2019-09-03 PROCEDURE — 25000125 ZZHC RX 250: Performed by: PEDIATRICS

## 2019-09-03 PROCEDURE — 71275 CT ANGIOGRAPHY CHEST: CPT

## 2019-09-03 RX ORDER — IOPAMIDOL 755 MG/ML
100 INJECTION, SOLUTION INTRAVASCULAR ONCE
Status: COMPLETED | OUTPATIENT
Start: 2019-09-03 | End: 2019-09-03

## 2019-09-03 RX ADMIN — LIDOCAINE HYDROCHLORIDE 0.2 ML: 10 INJECTION, SOLUTION EPIDURAL; INFILTRATION; INTRACAUDAL; PERINEURAL at 10:58

## 2019-09-03 RX ADMIN — SODIUM CHLORIDE 70 ML: 9 INJECTION, SOLUTION INTRAVENOUS at 10:56

## 2019-09-03 RX ADMIN — IOPAMIDOL 98 ML: 755 INJECTION, SOLUTION INTRAVENOUS at 10:51

## 2020-01-06 DIAGNOSIS — Q23.4 HYPOPLASTIC LEFT HEART SYNDROME: ICD-10-CM

## 2020-01-06 RX ORDER — DIGOXIN 250 MCG
250 TABLET ORAL 2 TIMES DAILY
Qty: 60 TABLET | Refills: 8 | Status: SHIPPED | OUTPATIENT
Start: 2020-01-06 | End: 2020-09-29

## 2020-01-06 NOTE — TELEPHONE ENCOUNTER
Refilled per nursing protocol.  Patient due to see Dr. Grajeda next in August 2020.    Kerline Tovar, RN Care Coordinator  [facility]

## 2020-04-02 RX ORDER — LISINOPRIL 20 MG/1
20 TABLET ORAL DAILY
Qty: 45 TABLET | Refills: 4 | Status: SHIPPED | OUTPATIENT
Start: 2020-04-02 | End: 2020-12-07

## 2020-08-24 ENCOUNTER — ANCILLARY PROCEDURE (OUTPATIENT)
Dept: CARDIOLOGY | Facility: CLINIC | Age: 25
End: 2020-08-24
Payer: MEDICARE

## 2020-08-24 ENCOUNTER — OFFICE VISIT (OUTPATIENT)
Dept: PEDIATRIC CARDIOLOGY | Facility: CLINIC | Age: 25
End: 2020-08-24
Payer: MEDICARE

## 2020-08-24 VITALS
HEIGHT: 65 IN | DIASTOLIC BLOOD PRESSURE: 63 MMHG | WEIGHT: 107.81 LBS | HEART RATE: 87 BPM | BODY MASS INDEX: 17.96 KG/M2 | SYSTOLIC BLOOD PRESSURE: 105 MMHG | OXYGEN SATURATION: 93 %

## 2020-08-24 DIAGNOSIS — Q23.4 HYPOPLASTIC LEFT HEART SYNDROME: ICD-10-CM

## 2020-08-24 DIAGNOSIS — Z98.890 S/P FONTAN PROCEDURE: ICD-10-CM

## 2020-08-24 DIAGNOSIS — Q23.4 HLHS (HYPOPLASTIC LEFT HEART SYNDROME): ICD-10-CM

## 2020-08-24 LAB — INTERPRETATION ECG - MUSE: NORMAL

## 2020-08-24 ASSESSMENT — MIFFLIN-ST. JEOR: SCORE: 1416.49

## 2020-08-24 ASSESSMENT — PAIN SCALES - GENERAL: PAINLEVEL: NO PAIN (0)

## 2020-08-24 NOTE — LETTER
8/24/2020      RE: Carlin Stern  [address]  [facility] [address]       [facility] Note             Assessment and Plan:     Carlin is a 25 year old male with     IMP: Carlin Stern is a 25 year old male with history of HLHS, s/p Non-Fenestrated Fontan procedure with developmental delay.     Doing well. Has nose bleeds and will decrease Aspirin to 4 days/week.  O2 sat 93% on RA    PLAN:    F/U in 1 year with Echo, EKG and O 2 sat  No Activity Restrictions  Adherence to heart healthy diet, regular exercise habits, avoidance of tobacco products and maintenance of a healthy weight  Will adjust medications    Aspirin 81 mg 4 days/week    Digoxin 250 mcg BID    Lisinopril 20 mg once daily  - SBE Prophylaxis needed for dental procedures  - Results were reviewed with the family.  -Will plan for labs and possibly Cath next year      Patient Active Problem List   Diagnosis     Neuromuscular scoliosis of thoracolumbar region       Patient Active Problem List    Diagnosis     Neuromuscular scoliosis of thoracolumbar region             Attending Attestation:     Echocardiographic images were reviewed by me.    History of Present Illness:   I was asked to see this patient by Primary Care Provider Basilio Rahman to consult regarding single ventricle.  s/p [place], and s/p Non-fenestrated Fontan procedure.   Carlin was last seen on /2015. He has undergone a Meagan, Jamie and Fontan procedures at [facility] in [place]. Carlin was adopted at the age of [age] and since the last follow-up visit, according to the parents, he has done well.     He  underwent MRI on 4/8/15 which showed mild narrowing of the proximal LPA segment with pulmonary distribution of 45% to rt. Lung and 55% to left lung. With dilated RV - EDV 80ml/m2 with moderate hypertrophy and EF of 38%.  Last treadmill test performed on 2/13/13: Normal ventilatory effort, Peak Vo2 low  61% of predicted. Low peak heart rate -indicating chronotropic incompetence. Reduced cardiac capacity.    He is developmentally delayed. His hearing is good. His left eye vision is abnormal. His medications include Digoxin 125 micrograms twice daily, aspirin 81 mg once daily and Lisinopril 15 mg once daily.     He has been active, stays at home and helps at home, plays basketball and walks outside.He has no shortness of breath, chest pain, syncope or pre-syncope. No other major illnesses or concerns. No major hospitalizations recently. His appetite is normal, sleeps well.     Last Echocardiogram Hypoplastic left heart syndrome. Patient has undergone Meagan, Jamie and Fontan operations. Large muscular ventricular septal defect with bidirectional flow. 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 Qualitatively mild/moderately depressed right ventricular systolic function. Normal appearance and motion of the systemic tricuspid valve, with moderate central regurgitation. Low-velocity phasic flow in the superior vena cava, superior cavopulmonary anastamosis, inferior vena cava, and proximal Fontan conduit; inferior cavopulmonary anastamosis and branch pulmonary arteries not well seen. Unobstructed neoaortic outflow with mild insufficiency. Aortic arch not well-seen on 2D; normal prograde flow without increased diastolic continuation, and normal abdominal aortic flow profile. No effusion.    Cardiac cath: Jan 2016, RV pressure 83/9, RV sat 95%, Fontan pressure 11 mm Hg, normal PVR and cardiac output. Fontan O 2 sat 73%. Normal flows across the branch pulmonary arteries.    Lab: Dec 2015, Hb- 13.9, pro BNP- 241, Troponin- less than 0.015, S.Cret 1.    Lab- 2019- Hb 15.1, BUN-25, cret- 1.06, pro       CT chest- 2019- Hypoplastic left heart status post [place] procedure.Small aortopulmonary collaterals, but no major collateralization is appreciated.Patency of the Jamie, but  "with predominant uni-directional flow tothe left pulmonary artery.Punctate right lower lobe nodule, likely postinfectious.  Scoliosis.     I have reviewed past medical family and social history with the patient or family.    Past Medical History:   No Recent Hospitalizations  Hypoplastic left heart syndrome  S/p Meagan, Jamie, Non-fenestrated Fontan procedure  Developmental delay       Family and Social History:   No history of congenital heart disease  Non-contributory         Review of Systems:   A comprehensive Review of Systems was performed is negative other than noted in the HPI  CV and Pulm ROS  are neg  No RIOS, sob, cyanosis, edema, cough, wheeze, syncope, chest pain, palpitations          Medications:   I have reviewed this patient's current medications    Current Outpatient Medications   Medication     aspirin (ASA) 81 MG tablet     digoxin (LANOXIN) 250 MCG tablet     lisinopril (ZESTRIL) 20 MG tablet     sertraline (ZOLOFT) 100 MG tablet     No current facility-administered medications for this visit.          Physical Exam:     Blood pressure 105/63, pulse 87, height 1.66 m (5' 5.35\"), weight 48.9 kg (107 lb 12.9 oz), SpO2 93 %.    General - NAD, awake, alert   HEENT - NC/AT EOMI   Cardiac - RRR nl S1 and S2 heard, systolic murmur grade 1/6 LSB. Diastolic murmur grade 2/4 No click, thrill or heave   Respiratory - Lungs clear, no rales   Abdominal - Liver at RCM   Extremity  Nl pulses in brachial and femoral areas, No Clubbing, Edema, Cyanosis   Skin - No rash   Neuro - Nl gait, posture, tone         Labs     EKG results:      EKG with today's visit V-rate of 73/min, sinus, RBBB.  msec.      Echocardiography today:  Hypoplastic left heart syndrome. Patient has undergone Meagan, Jamie and  Fontan operations. Large muscular ventricular septal defect with bidirectional  (systolic right to left) flow. Hypoplastic mitral valve annulus and left  ventricular cavity; dilated right ventricle. " Qualitatively mild/moderately  depressed right ventricular systolic function. Normal appearance and motion of  the systemic tricuspid valve, with moderate central regurgitation. Low-  velocity phasic flow in the superior vena cava, superior cavopulmonary  anastamosis, inferior vena cava, and proximal Fontan conduit; inferior  cavopulmonary anastamosis and branch pulmonary arteries not well seen.  Unobstructed neoaortic outflow with mild insufficiency. Aortic arch not well-  seen on 2D; normal prograde flow without increased diastolic continuation, and  normal abdominal aortic flow profile. No effusion.  No significant change from last echocardiogram.       Patient Education: During this visit I discussed in detail the patient s symptoms, physical exam and evaluation results findings, tentative diagnosis as well as the treatment plan (Including but not limited to possible side effects and complications related to the disease, treatment modalities and intervention(s). Family expressed understanding and consent. Family was receptive and ready to learn; no apparent learning barriers were identified.    Sincerely,    Nicci Rene MD, [facility]  Pediatric Cardiologist    CC:   Copy to patient  ROB MADRIGAL James  [address] [facility] [address]

## 2020-08-24 NOTE — NURSING NOTE
"[facility] [[id number]]  Chief Complaint   Patient presents with     Heart Problem     HLHS     Initial /63 (BP Location: Right arm, Patient Position: Sitting, Cuff Size: Adult Regular)   Pulse 87   Ht 1.66 m (5' 5.35\")   Wt 48.9 kg (107 lb 12.9 oz)   BMI 17.75 kg/m   Estimated body mass index is 17.75 kg/m  as calculated from the following:    Height as of this encounter: 1.66 m (5' 5.35\").    Weight as of this encounter: 48.9 kg (107 lb 12.9 oz).  Medication Reconciliation: complete     PHQ2 not done due to developmental delays     "

## 2020-08-24 NOTE — PROGRESS NOTES
[facility] Note             Assessment and Plan:     Carlin is a 25 year old male with     IMP: Carlin Stern is a 25 year old male with history of HLHS, s/p Non-Fenestrated Fontan procedure with developmental delay.     Doing well. Has nose bleeds and will decrease Aspirin to 4 days/week.  O2 sat 93% on RA    PLAN:    F/U in 1 year with Echo, EKG and O 2 sat  No Activity Restrictions  Adherence to heart healthy diet, regular exercise habits, avoidance of tobacco products and maintenance of a healthy weight  Will adjust medications    Aspirin 81 mg 4 days/week    Digoxin 250 mcg BID    Lisinopril 20 mg once daily  - SBE Prophylaxis needed for dental procedures  - Results were reviewed with the family.  -Will plan for labs and possibly Cath next year      Patient Active Problem List   Diagnosis     Neuromuscular scoliosis of thoracolumbar region       Patient Active Problem List    Diagnosis     Neuromuscular scoliosis of thoracolumbar region             Attending Attestation:     Echocardiographic images were reviewed by me.    History of Present Illness:   I was asked to see this patient by Primary Care Provider Basilio Rahman to consult regarding single ventricle.  s/p Cassandra, and s/p Non-fenestrated Fontan procedure.   Carlin was last seen on /2015. He has undergone a Cass[place], Jamie and Fontan procedures at [facility] in [place]. aCrlin was adopted at the age of [age] and since the last follow-up visit, according to the parents, he has done well.     He  underwent MRI on 4/8/15 which showed mild narrowing of the proximal LPA segment with pulmonary distribution of 45% to rt. Lung and 55% to left lung. With dilated RV - EDV 80ml/m2 with moderate hypertrophy and EF of 38%.  Last treadmill test performed on 2/13/13: Normal ventilatory effort, Peak Vo2 low 61% of predicted. Low peak heart rate -indicating chronotropic incompetence.  Reduced cardiac capacity.    He is developmentally delayed. His hearing is good. His left eye vision is abnormal. His medications include Digoxin 125 micrograms twice daily, aspirin 81 mg once daily and Lisinopril 15 mg once daily.     He has been active, stays at home and helps at home, plays basketball and walks outside.He has no shortness of breath, chest pain, syncope or pre-syncope. No other major illnesses or concerns. No major hospitalizations recently. His appetite is normal, sleeps well.     Last Echocardiogram Hypoplastic left heart syndrome. Patient has undergone Meagan, Jamie and Fontan operations. Large muscular ventricular septal defect with bidirectional flow. 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 Qualitatively mild/moderately depressed right ventricular systolic function. Normal appearance and motion of the systemic tricuspid valve, with moderate central regurgitation. Low-velocity phasic flow in the superior vena cava, superior cavopulmonary anastamosis, inferior vena cava, and proximal Fontan conduit; inferior cavopulmonary anastamosis and branch pulmonary arteries not well seen. Unobstructed neoaortic outflow with mild insufficiency. Aortic arch not well-seen on 2D; normal prograde flow without increased diastolic continuation, and normal abdominal aortic flow profile. No effusion.    Cardiac cath: Jan 2016, RV pressure 83/9, RV sat 95%, Fontan pressure 11 mm Hg, normal PVR and cardiac output. Fontan O 2 sat 73%. Normal flows across the branch pulmonary arteries.    Lab: Dec 2015, Hb- 13.9, pro BNP- 241, Troponin- less than 0.015, S.Cret 1.    Lab- 2019- Hb 15.1, BUN-25, cret- 1.06, pro       CT chest- 2019- Hypoplastic left heart status post Story procedure.Small aortopulmonary collaterals, but no major collateralization is appreciated.Patency of the Jamie, but with predominant uni-directional flow tothe left pulmonary artery.Punctate  "right lower lobe nodule, likely postinfectious.  Scoliosis.     I have reviewed past medical family and social history with the patient or family.    Past Medical History:   No Recent Hospitalizations  Hypoplastic left heart syndrome  S/p Meagan, Jamie, Non-fenestrated Fontan procedure  Developmental delay       Family and Social History:   No history of congenital heart disease  Non-contributory         Review of Systems:   A comprehensive Review of Systems was performed is negative other than noted in the HPI  CV and Pulm ROS  are neg  No RIOS, sob, cyanosis, edema, cough, wheeze, syncope, chest pain, palpitations          Medications:   I have reviewed this patient's current medications    Current Outpatient Medications   Medication     aspirin (ASA) 81 MG tablet     digoxin (LANOXIN) 250 MCG tablet     lisinopril (ZESTRIL) 20 MG tablet     sertraline (ZOLOFT) 100 MG tablet     No current facility-administered medications for this visit.          Physical Exam:     Blood pressure 105/63, pulse 87, height 1.66 m (5' 5.35\"), weight 48.9 kg (107 lb 12.9 oz), SpO2 93 %.    General - NAD, awake, alert   HEENT - NC/AT EOMI   Cardiac - RRR nl S1 and S2 heard, systolic murmur grade 1/6 LSB. Diastolic murmur grade 2/4 No click, thrill or heave   Respiratory - Lungs clear, no rales   Abdominal - Liver at RCM   Extremity  Nl pulses in brachial and femoral areas, No Clubbing, Edema, Cyanosis   Skin - No rash   Neuro - Nl gait, posture, tone         Labs     EKG results:      EKG with today's visit V-rate of 73/min, sinus, RBBB.  msec.      Echocardiography today:  Hypoplastic left heart syndrome. Patient has undergone [place], Jamie and  Fontan operations. Large muscular ventricular septal defect with bidirectional  (systolic right to left) flow. Hypoplastic mitral valve annulus and left  ventricular cavity; dilated right ventricle. Qualitatively mild/moderately  depressed right ventricular systolic function. Normal " appearance and motion of  the systemic tricuspid valve, with moderate central regurgitation. Low-  velocity phasic flow in the superior vena cava, superior cavopulmonary  anastamosis, inferior vena cava, and proximal Fontan conduit; inferior  cavopulmonary anastamosis and branch pulmonary arteries not well seen.  Unobstructed neoaortic outflow with mild insufficiency. Aortic arch not well-  seen on 2D; normal prograde flow without increased diastolic continuation, and  normal abdominal aortic flow profile. No effusion.  No significant change from last echocardiogram.       Patient Education: During this visit I discussed in detail the patient s symptoms, physical exam and evaluation results findings, tentative diagnosis as well as the treatment plan (Including but not limited to possible side effects and complications related to the disease, treatment modalities and intervention(s). Family expressed understanding and consent. Family was receptive and ready to learn; no apparent learning barriers were identified.    Sincerely,    Nicci Rene MD, AMEE  Pediatric Cardiologist    CC:   Copy to patient  KAITLINROBBasilio Matt  [address] [facility] [address]

## 2020-08-24 NOTE — PATIENT INSTRUCTIONS
[facility] [place]      Pediatric Call Center Scheduling and Nurse Questions:  [phone]  Kerline Tovra RN Care Coordinator    After Hours Needing Immediate Care:  [phone].  Ask for the on-call pediatric doctor for the specialty you are calling for be paged.  For dermatology urgent matters that cannot wait until the next business day, is over a holiday and/or a weekend please call [phone] and ask for the Dermatology Resident On-Call to be paged.    Prescription Renewals:  Please call your pharmacy first.  Your pharmacy must fax requests to [phone].  Please allow 2-3 days for prescriptions to be authorized.    If your physician has ordered a CT or MRI, you may schedule this test by calling [facility] in [place] at [phone].    **If your child is having a sedated procedure, they will need a history and physical done at their Primary Care Provider within 30 days of the procedure.  If your child was seen by the ordering provider in our office within 30 days of the procedure, their visit summary will work for the H&P unless they inform you otherwise.  If you have any questions, please call the RN Care Coordinator.**

## 2020-09-29 RX ORDER — DIGOXIN 250 MCG
250 TABLET ORAL 2 TIMES DAILY
Qty: 60 TABLET | Refills: 11 | Status: SHIPPED | OUTPATIENT
Start: 2020-09-29 | End: 2021-10-05

## 2020-12-07 DIAGNOSIS — Q23.4 HYPOPLASTIC LEFT HEART SYNDROME: ICD-10-CM

## 2020-12-07 RX ORDER — LISINOPRIL 20 MG/1
20 TABLET ORAL DAILY
Qty: 45 TABLET | Refills: 1 | Status: SHIPPED | OUTPATIENT
Start: 2020-12-07 | End: 2021-04-06

## 2020-12-07 NOTE — TELEPHONE ENCOUNTER
Patient last saw Dr. Grajeda on 8/24/20, and was told to return in 1 year.    This is a faxed refill request from the pharmacy.

## 2021-02-10 ENCOUNTER — TELEPHONE (OUTPATIENT)
Dept: PEDIATRIC CARDIOLOGY | Facility: CLINIC | Age: 26
End: 2021-02-10

## 2021-02-10 NOTE — TELEPHONE ENCOUNTER
[facility] Call Center    Phone Message    May a detailed message be left on voicemail: yes     Reason for Call: Other: Vaccine Eligibility    FOC called in requesting a clinical provider to call him back. He would like to discuss if there was any cardio indications that would make him ineligible for a covid vaccine. Please return the call.     Action Taken: Other: PEDS CARDIO     Travel Screening: Not Applicable

## 2021-02-18 NOTE — TELEPHONE ENCOUNTER
[facility] Call Center    Phone Message    May a detailed message be left on voicemail: yes     Reason for Call: Other: Call back      Dad called to check on status of pt's eligibility for the vaccine. Please reach out to dad soon regarding this.    Action Taken: Message routed to:  Other: Ped's cardiology [facility]    Travel Screening: Not Applicable

## 2021-02-22 NOTE — TELEPHONE ENCOUNTER
Called dad back.  Let him know he can use the website [email or web address] to continue to check if Carlin is eligible to get the vaccine.  He is not contraindicated due to his cardiac background.  Dad asked if he could get it while on blood thinners.  Per CDC, patients can get while on blood thinners if through a provider who is comfortable knowing the risks of bleeding with IM injections and management.     Dad verbalized understanding and will call back with any questions or concerns.    Kerline Tovar RN Care Coordinator   [facility]

## 2021-04-06 ENCOUNTER — TELEPHONE (OUTPATIENT)
Dept: PEDIATRIC CARDIOLOGY | Facility: CLINIC | Age: 26
End: 2021-04-06

## 2021-04-06 DIAGNOSIS — Q23.4 HYPOPLASTIC LEFT HEART SYNDROME: ICD-10-CM

## 2021-04-06 RX ORDER — LISINOPRIL 20 MG/1
20 TABLET ORAL DAILY
Qty: 45 TABLET | Refills: 3 | Status: SHIPPED | OUTPATIENT
Start: 2021-04-06 | End: 2021-10-05

## 2021-10-05 DIAGNOSIS — Q23.4 HYPOPLASTIC LEFT HEART SYNDROME: ICD-10-CM

## 2021-10-05 RX ORDER — LISINOPRIL 20 MG/1
20 TABLET ORAL DAILY
Qty: 45 TABLET | Refills: 3 | Status: ON HOLD | OUTPATIENT
Start: 2021-10-05 | End: 2022-03-22

## 2021-10-05 RX ORDER — DIGOXIN 250 MCG
250 TABLET ORAL 2 TIMES DAILY
Qty: 60 TABLET | Refills: 11 | Status: SHIPPED | OUTPATIENT
Start: 2021-10-05 | End: 2022-09-30

## 2021-10-13 DIAGNOSIS — Q23.4 HLHS (HYPOPLASTIC LEFT HEART SYNDROME): Primary | ICD-10-CM

## 2021-10-13 DIAGNOSIS — Z98.890 S/P FONTAN PROCEDURE: ICD-10-CM

## 2021-10-18 ENCOUNTER — OFFICE VISIT (OUTPATIENT)
Dept: PEDIATRIC CARDIOLOGY | Facility: CLINIC | Age: 26
End: 2021-10-18
Payer: MEDICARE

## 2021-10-18 ENCOUNTER — MEDICAL CORRESPONDENCE (OUTPATIENT)
Dept: HEALTH INFORMATION MANAGEMENT | Facility: CLINIC | Age: 26
End: 2021-10-18

## 2021-10-18 ENCOUNTER — ANCILLARY PROCEDURE (OUTPATIENT)
Dept: CARDIOLOGY | Facility: CLINIC | Age: 26
End: 2021-10-18
Payer: MEDICARE

## 2021-10-18 VITALS
HEART RATE: 90 BPM | WEIGHT: 110.67 LBS | OXYGEN SATURATION: 94 % | DIASTOLIC BLOOD PRESSURE: 56 MMHG | HEIGHT: 65 IN | SYSTOLIC BLOOD PRESSURE: 125 MMHG | BODY MASS INDEX: 18.44 KG/M2

## 2021-10-18 DIAGNOSIS — Z98.890 S/P FONTAN PROCEDURE: ICD-10-CM

## 2021-10-18 DIAGNOSIS — Q23.4 HLHS (HYPOPLASTIC LEFT HEART SYNDROME): ICD-10-CM

## 2021-10-18 DIAGNOSIS — Q23.4 HLHS (HYPOPLASTIC LEFT HEART SYNDROME): Primary | ICD-10-CM

## 2021-10-18 LAB
ATRIAL RATE - MUSE: 75 BPM
DIASTOLIC BLOOD PRESSURE - MUSE: NORMAL MMHG
INTERPRETATION ECG - MUSE: NORMAL
P AXIS - MUSE: 25 DEGREES
PR INTERVAL - MUSE: 144 MS
QRS DURATION - MUSE: 124 MS
QT - MUSE: 358 MS
QTC - MUSE: 399 MS
R AXIS - MUSE: 123 DEGREES
SYSTOLIC BLOOD PRESSURE - MUSE: NORMAL MMHG
T AXIS - MUSE: 9 DEGREES
VENTRICULAR RATE- MUSE: 75 BPM

## 2021-10-18 PROCEDURE — 93325 DOPPLER ECHO COLOR FLOW MAPG: CPT | Performed by: PEDIATRICS

## 2021-10-18 PROCEDURE — 99213 OFFICE O/P EST LOW 20 MIN: CPT | Mod: 25 | Performed by: PEDIATRICS

## 2021-10-18 PROCEDURE — 93320 DOPPLER ECHO COMPLETE: CPT | Performed by: PEDIATRICS

## 2021-10-18 PROCEDURE — 93303 ECHO TRANSTHORACIC: CPT | Performed by: PEDIATRICS

## 2021-10-18 ASSESSMENT — PAIN SCALES - GENERAL: PAINLEVEL: NO PAIN (0)

## 2021-10-18 ASSESSMENT — MIFFLIN-ST. JEOR: SCORE: 1415.13

## 2021-10-18 NOTE — PROGRESS NOTES
[facility] Note             Assessment and Plan:     Carlin is a 26 year old male with     IMP: Carlin Stern is a 26 year old male with history of HLHS, s/p Non-Fenestrated Fontan procedure with developmental delay.     Doing well.   O2 sat 94% on RA    PLAN:    F/U in 1 year with Echo, EKG and O 2 sat  Discussed in regards to transition to adult congenital program at [facility]  He will need to undergo cardiac cath to assess hemodynamics and obtain labs (CBC, LFT, BMP, pro BNP, CRP, Troponin) in Dec 2021  Follow-up with the Dentist prior to cardiac cath  No Activity Restrictions  Adherence to heart healthy diet, regular exercise habits, avoidance of tobacco products and maintenance of a healthy weight  Will adjust medications    Aspirin 81 mg 4 days/week    Digoxin 250 mcg BID    Lisinopril 30 mg once daily  - SBE Prophylaxis needed for dental procedures  - Results were reviewed with the family.  -Will plan for labs and possibly Cath next year      Patient Active Problem List   Diagnosis     Neuromuscular scoliosis of thoracolumbar region       Patient Active Problem List    Diagnosis     Neuromuscular scoliosis of thoracolumbar region             Attending Attestation:     Echocardiographic images were reviewed by me.    History of Present Illness:   I was asked to see this patient by Primary Care Provider Basilio Rahman to consult regarding single ventricle.  s/p Meagan, and s/p Non-fenestrated Fontan procedure.   Carlin was last seen in 2020. He has undergone a [place], Jamie and Fontan procedures at [facility] in [place]. Carlin was adopted at the age of [age] and since the last follow-up visit, according to the parents, he has done well.     He  underwent MRI on 4/8/15 which showed mild narrowing of the proximal LPA segment with pulmonary distribution of 45% to rt. Lung and 55% to left lung. With dilated RV - EDV 80ml/m2 with  moderate hypertrophy and EF of 38%.  Last treadmill test performed on 2/13/13: Normal ventilatory effort, Peak Vo2 low 61% of predicted. Low peak heart rate -indicating chronotropic incompetence. Reduced cardiac capacity.    He is developmentally delayed. His hearing is good. His left eye vision is abnormal. His medications include Digoxin 125 micrograms twice daily, aspirin 81 mg once daily and Lisinopril 20 mg once daily.     He has been active, stays at home and helps at home,walks outside.He has no shortness of breath, chest pain, syncope or pre-syncope. No other major illnesses or concerns. No major hospitalizations recently. His appetite is normal, sleeps well.     Last Echocardiogram - Hypoplastic left heart syndrome. Patient has undergone Meagan, Jamie and Fontan operations. Large muscular ventricular septal defect with bidirectional  (systolic right to left) flow. 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 Qualitatively mild/moderately depressed right ventricular systolic function. Normal appearance and motion of the systemic tricuspid valve, with moderate central regurgitation. Low- velocity phasic flow in the superior vena cava, superior cavopulmonary anastamosis, inferior vena cava, and proximal Fontan conduit; inferior cavopulmonary anastamosis and branch pulmonary arteries not well seen. Unobstructed neoaortic outflow with mild insufficiency. Aortic arch not well- seen on 2D; normal prograde flow without increased diastolic continuation, and normal abdominal aortic flow profile. No effusion. No significant change from last echocardiogram.    Cardiac cath: Jan 2016, RV pressure 83/9, RV sat 95%, Fontan pressure 11 mm Hg, normal PVR and cardiac output. Fontan O 2 sat 73%. Normal flows across the branch pulmonary arteries.    Lab: Dec 2015, Hb- 13.9, pro BNP- 241, Troponin- less than 0.015, S.Cret 1.    Lab- 2019- Hb 15.1, BUN-25, cret- 1.06, pro BNP  "306      CT chest- 2019- Hypoplastic left heart status post Meagan procedure.Small aortopulmonary collaterals, but no major collateralization is appreciated.Patency of the Jamie, but with predominant uni-directional flow tothe left pulmonary artery.Punctate right lower lobe nodule, likely postinfectious.  Scoliosis.     I have reviewed past medical family and social history with the patient or family.    Past Medical History:   No Recent Hospitalizations  Hypoplastic left heart syndrome  S/p Meagan, Jamie, Non-fenestrated Fontan procedure  Developmental delay       Family and Social History:   No history of congenital heart disease  Non-contributory         Review of Systems:   A comprehensive Review of Systems was performed is negative other than noted in the HPI  CV and Pulm ROS  are neg  No RIOS, sob, cyanosis, edema, cough, wheeze, syncope, chest pain, palpitations          Medications:   I have reviewed this patient's current medications    Current Outpatient Medications   Medication     aspirin (ASA) 81 MG tablet     digoxin (LANOXIN) 250 MCG tablet     lisinopril (ZESTRIL) 20 MG tablet     sertraline (ZOLOFT) 100 MG tablet     No current facility-administered medications for this visit.         Physical Exam:     Blood pressure 125/56, pulse 90, height 1.645 m (5' 4.76\"), weight 50.2 kg (110 lb 10.7 oz), SpO2 94 %.    General - NAD, awake, alert   HEENT - NC/AT EOMI   Cardiac - RRR nl S1 and S2 heard, systolic murmur grade 1/6 LSB. Diastolic murmur grade 2/4 No click, thrill or heave   Respiratory - Lungs clear, no rales   Abdominal - Liver at RCM   Extremity  Nl pulses in brachial and femoral areas, No Clubbing, Edema, Cyanosis   Skin - No rash   Neuro - Nl gait, posture, tone         Labs     EKG results:      EKG with today's visit V-rate of 75/min, sinus, RBBB.  msec.      Echocardiography today:  Hypoplastic left heart syndrome. Patient has undergone Meagan, Jamie and Fontan operations. Large " muscular ventricular septal defect with bidirectional (systolic right to left) flow. 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 Qualitatively mild/moderately depressed right ventricular systolic function. Normal appearance and motion of  the systemic tricuspid valve, with moderate central regurgitation. Low- velocity phasic flow in the superior vena cava, inferior vena cava, and proximal Fontan conduit; branch pulmonary arteries not well seen. Unobstructed neoaortic outflow with mild insufficiency. Aortic arch not well seen. There is normal pulsatile flow in the descending abdominal aorta. No effusion.No significant change from last echocardiogram.       Patient Education: During this visit I discussed in detail the patient s symptoms, physical exam and evaluation results findings, tentative diagnosis as well as the treatment plan (Including but not limited to possible side effects and complications related to the disease, treatment modalities and intervention(s). Family expressed understanding and consent. Family was receptive and ready to learn; no apparent learning barriers were identified.    Sincerely,    Nicci Rene MD, AMEE  Pediatric Cardiologist    CC:   Copy to patient  ROB MADRIGAL James  [id number] [facility] [address]

## 2021-10-18 NOTE — LETTER
10/18/2021      RE: Carlin Stern  [address]  [facility] [address]       [facility] Note             Assessment and Plan:     Carlin is a 26 year old male with     IMP: Carlin Stern is a 26 year old male with history of HLHS, s/p Non-Fenestrated Fontan procedure with developmental delay.     Doing well.   O2 sat 94% on RA    PLAN:    F/U in 1 year with Echo, EKG and O 2 sat  Discussed in regards to transition to adult congenital program at [facility]  He will need to undergo cardiac cath to assess hemodynamics and obtain labs (CBC, LFT, BMP, pro BNP, CRP, Troponin) in Dec 2021  Follow-up with the Dentist prior to cardiac cath  No Activity Restrictions  Adherence to heart healthy diet, regular exercise habits, avoidance of tobacco products and maintenance of a healthy weight  Will adjust medications    Aspirin 81 mg 4 days/week    Digoxin 250 mcg BID    Lisinopril 30 mg once daily  - SBE Prophylaxis needed for dental procedures  - Results were reviewed with the family.  -Will plan for labs and possibly Cath next year      Patient Active Problem List   Diagnosis     Neuromuscular scoliosis of thoracolumbar region       Patient Active Problem List    Diagnosis     Neuromuscular scoliosis of thoracolumbar region             Attending Attestation:     Echocardiographic images were reviewed by me.    History of Present Illness:   I was asked to see this patient by Primary Care Provider Basilio Rahman to consult regarding single ventricle.  s/p Meagan, and s/p Non-fenestrated Fontan procedure.   Carlin was last seen in 2020. He has undergone a [place], Jamie and Fontan procedures at [facility] in [place]. Carlin was adopted at the age of [age] and since the last follow-up visit, according to the parents, he has done well.     He  underwent MRI on 4/8/15 which showed mild narrowing of the proximal LPA segment with pulmonary  distribution of 45% to rt. Lung and 55% to left lung. With dilated RV - EDV 80ml/m2 with moderate hypertrophy and EF of 38%.  Last treadmill test performed on 2/13/13: Normal ventilatory effort, Peak Vo2 low 61% of predicted. Low peak heart rate -indicating chronotropic incompetence. Reduced cardiac capacity.    He is developmentally delayed. His hearing is good. His left eye vision is abnormal. His medications include Digoxin 125 micrograms twice daily, aspirin 81 mg once daily and Lisinopril 20 mg once daily.     He has been active, stays at home and helps at home,walks outside.He has no shortness of breath, chest pain, syncope or pre-syncope. No other major illnesses or concerns. No major hospitalizations recently. His appetite is normal, sleeps well.     Last Echocardiogram - Hypoplastic left heart syndrome. Patient has undergone [place], Jamie and Fontan operations. Large muscular ventricular septal defect with bidirectional  (systolic right to left) flow. 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 Qualitatively mild/moderately depressed right ventricular systolic function. Normal appearance and motion of the systemic tricuspid valve, with moderate central regurgitation. Low- velocity phasic flow in the superior vena cava, superior cavopulmonary anastamosis, inferior vena cava, and proximal Fontan conduit; inferior cavopulmonary anastamosis and branch pulmonary arteries not well seen. Unobstructed neoaortic outflow with mild insufficiency. Aortic arch not well- seen on 2D; normal prograde flow without increased diastolic continuation, and normal abdominal aortic flow profile. No effusion. No significant change from last echocardiogram.    Cardiac cath: Jan 2016, RV pressure 83/9, RV sat 95%, Fontan pressure 11 mm Hg, normal PVR and cardiac output. Fontan O 2 sat 73%. Normal flows across the branch pulmonary arteries.    Lab: Dec 2015, Hb- 13.9, pro BNP- 241, Troponin-  "less than 0.015, S.Cret 1.    Lab- 2019- Hb 15.1, BUN-25, cret- 1.06, pro       CT chest- 2019- Hypoplastic left heart status post Meagan procedure.Small aortopulmonary collaterals, but no major collateralization is appreciated.Patency of the Jamie, but with predominant uni-directional flow tothe left pulmonary artery.Punctate right lower lobe nodule, likely postinfectious.  Scoliosis.     I have reviewed past medical family and social history with the patient or family.    Past Medical History:   No Recent Hospitalizations  Hypoplastic left heart syndrome  S/p [place], Jamie, Non-fenestrated Fontan procedure  Developmental delay       Family and Social History:   No history of congenital heart disease  Non-contributory         Review of Systems:   A comprehensive Review of Systems was performed is negative other than noted in the HPI  CV and Pulm ROS  are neg  No RIOS, sob, cyanosis, edema, cough, wheeze, syncope, chest pain, palpitations          Medications:   I have reviewed this patient's current medications    Current Outpatient Medications   Medication     aspirin (ASA) 81 MG tablet     digoxin (LANOXIN) 250 MCG tablet     lisinopril (ZESTRIL) 20 MG tablet     sertraline (ZOLOFT) 100 MG tablet     No current facility-administered medications for this visit.         Physical Exam:     Blood pressure 125/56, pulse 90, height 1.645 m (5' 4.76\"), weight 50.2 kg (110 lb 10.7 oz), SpO2 94 %.    General - NAD, awake, alert   HEENT - NC/AT EOMI   Cardiac - RRR nl S1 and S2 heard, systolic murmur grade 1/6 LSB. Diastolic murmur grade 2/4 No click, thrill or heave   Respiratory - Lungs clear, no rales   Abdominal - Liver at [facility]   Extremity  Nl pulses in brachial and femoral areas, No Clubbing, Edema, Cyanosis   Skin - No rash   Neuro - Nl gait, posture, tone         Labs     EKG results:      EKG with today's visit V-rate of 75/min, sinus, RBBB.  msec.      Echocardiography today:  Hypoplastic left heart " syndrome. Patient has undergone Meagan, Jamie and Fontan operations. Large muscular ventricular septal defect with bidirectional (systolic right to left) flow. 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 Qualitatively mild/moderately depressed right ventricular systolic function. Normal appearance and motion of  the systemic tricuspid valve, with moderate central regurgitation. Low- velocity phasic flow in the superior vena cava, inferior vena cava, and proximal Fontan conduit; branch pulmonary arteries not well seen. Unobstructed neoaortic outflow with mild insufficiency. Aortic arch not well seen. There is normal pulsatile flow in the descending abdominal aorta. No effusion.No significant change from last echocardiogram.       Patient Education: During this visit I discussed in detail the patient s symptoms, physical exam and evaluation results findings, tentative diagnosis as well as the treatment plan (Including but not limited to possible side effects and complications related to the disease, treatment modalities and intervention(s). Family expressed understanding and consent. Family was receptive and ready to learn; no apparent learning barriers were identified.    Sincerely,    Nicci Rene MD, [facility]  Pediatric Cardiologist      Copy to patient  ROB MADRIGAL James  [address] [facility] [address]

## 2021-10-18 NOTE — PATIENT INSTRUCTIONS
[facility]  [facility] Specialty [facility] [place]      Pediatric Call Center Scheduling and Nurse Questions:  [phone]  Kerline Tovar, RN Care Coordinator    After hours urgent matters that cannot wait until the next business day:  [phone].  Ask for the on-call pediatric doctor for the specialty you are calling for be paged.    For dermatology urgent matters that cannot wait until the next business day, is over a holiday and/or a weekend please call [phone] and ask for the Dermatology Resident On-Call to be paged.    Prescription Renewals:  Please call your pharmacy first.  Your pharmacy must fax requests to [phone].  Please allow 2-3 days for prescriptions to be authorized.    If your physician has ordered a CT or MRI, you may schedule this test by calling [facility] in [place] at [phone].    **If your child is having a sedated procedure, they will need a history and physical done at their Primary Care Provider within 30 days of the procedure.  If your child was seen by the ordering provider in our office within 30 days of the procedure, their visit summary will work for the H&P unless they inform you otherwise.  If you have any questions, please call the RN Care Coordinator.**

## 2021-10-19 DIAGNOSIS — Q20.4 SINGLE COMMON VENTRICLE: Primary | ICD-10-CM

## 2021-10-20 ENCOUNTER — TELEPHONE (OUTPATIENT)
Dept: PEDIATRIC CARDIOLOGY | Facility: CLINIC | Age: 26
End: 2021-10-20

## 2021-10-20 DIAGNOSIS — Q23.4 HLHS (HYPOPLASTIC LEFT HEART SYNDROME): Primary | ICD-10-CM

## 2021-10-20 RX ORDER — AMOXICILLIN 500 MG/1
2000 CAPSULE ORAL ONCE
Qty: 4 CAPSULE | Refills: 0 | Status: SHIPPED | OUTPATIENT
Start: 2021-10-20 | End: 2021-10-20

## 2021-10-20 NOTE — TELEPHONE ENCOUNTER
[facility] Call Center    Phone Message    May a detailed message be left on voicemail: yes     Reason for Call: Other: Father Chaitanya is calling requesting to get an prophylactic antibiotic sent to pharmacy, asap. Father was able to get in for a dentist appointment/procedure that is for tomorrow at 4pm and will need this request expedited.     Please contact Chaitanya once request is complete or for any additional information that is needed.    Pharmacy: Father is requesting antibiotic sent to:  [facility] [address]      Action Taken: Message routed to:  [facility]    Travel Screening: Not Applicable

## 2021-10-20 NOTE — TELEPHONE ENCOUNTER
SBE prophylactic antibiotics sent per AHA guidelines and Dr. Grajeda's last visit note:  amoxicillin (AMOXIL) 500 MG capsule 4 capsule 0 10/20/2021 10/20/2021 --   Sig - Route: Take 4 capsules (2,000 mg) by mouth once for 1 dose 1 hour prior to dental appt - Oral   Sent to pharmacy as: Amoxicillin 500 MG Oral Capsule (AMOXIL)   Class: E-Prescribe   Notes to Pharmacy: Per AHA guidelines Amoxicillin 50mg/kg     Milvia Reid RN

## 2021-10-25 DIAGNOSIS — I42.5 CARDIOMYOPATHY, RESTRICTIVE (H): ICD-10-CM

## 2021-10-25 DIAGNOSIS — J98.4 LUNG DISEASE, RESTRICTIVE: ICD-10-CM

## 2021-10-27 ENCOUNTER — TELEPHONE (OUTPATIENT)
Dept: PEDIATRIC CARDIOLOGY | Facility: CLINIC | Age: 26
End: 2021-10-27

## 2021-10-27 NOTE — TELEPHONE ENCOUNTER
----- Message from Sherry Peters sent at 10/25/2021 12:35 PM CDT -----  Regarding: [place] Patient  Flora from [facility] has questions about  Dr Rene's referral of Carlin Stern. Please call her back at [phone].     Returned call to Flora in pulmonology, [facility] to return call to clinic for further discussion.  Milvia Reid RN

## 2021-11-02 NOTE — TELEPHONE ENCOUNTER
RECORDS RECEIVED FROM: internal    DATE RECEIVED: 1.31.22    NOTES STATUS DETAILS   OFFICE NOTE from referring provider internal  Nicci Rene MBBS   OFFICE NOTE from other specialist na    DISCHARGE SUMMARY from hospital na    DISCHARGE REPORT from the ER na    MEDICATION LIST internal     IMAGING  (NEED IMAGES AND REPORTS)     CT SCAN na    CHEST XRAY (CXR) internal    Scheduled 1.31.22   TESTS     PULMONARY FUNCTION TESTING (PFT) internal  Scheduled 1.31.22

## 2021-11-23 ENCOUNTER — TELEPHONE (OUTPATIENT)
Dept: PEDIATRIC CARDIOLOGY | Facility: CLINIC | Age: 26
End: 2021-11-23
Payer: MEDICARE

## 2021-11-23 NOTE — TELEPHONE ENCOUNTER
[facility] Call Center    Phone Message    May a detailed message be left on voicemail: yes     Reason for Call: Other: Patient has a heart cath procedure scheduled for 12/21. Patient's father has some questions. Please call to discuss.     Action Taken: Other: Peds Cardiology    Travel Screening: Not Applicable

## 2021-11-24 NOTE — TELEPHONE ENCOUNTER
Call placed to Carlin Richmond, and he reports that Carlin and the family has had COVID, with Carlin testing positive 11/8.  Dad feels that Carlin is back to his baseline, and mentions the rest of the family does have lingering effects.  Message sent to the team to inquire about postponing 12/21 cath, awaiting call back and then will reach out to dad.

## 2021-12-02 DIAGNOSIS — Z98.890 S/P FONTAN PROCEDURE: ICD-10-CM

## 2021-12-02 DIAGNOSIS — Q23.4 HLHS (HYPOPLASTIC LEFT HEART SYNDROME): Primary | ICD-10-CM

## 2021-12-02 DIAGNOSIS — Q23.4 HYPOPLASTIC LEFT HEART SYNDROME: ICD-10-CM

## 2021-12-02 RX ORDER — LISINOPRIL 30 MG/1
30 TABLET ORAL DAILY
Qty: 30 TABLET | Refills: 11 | Status: SHIPPED | OUTPATIENT
Start: 2021-12-02 | End: 2022-12-08

## 2021-12-02 RX ORDER — LISINOPRIL 30 MG/1
30 TABLET ORAL DAILY
COMMUNITY
End: 2021-12-02

## 2021-12-02 NOTE — TELEPHONE ENCOUNTER
Patient last saw Dr. Grajeda on 10/18/21, and was told to follow-up in 1 year.    On 10/18/21, Dr. Grajeda states that she wants Carlin to take Lisinopril 30 mg daily.    [facility] @ [address] [facility] [address]. [place], [place] sent a request for a new script of the increased dose.

## 2021-12-03 NOTE — TELEPHONE ENCOUNTER
RECORDS RECEIVED FROM: Internal   Appt Date: 12.06.2021   NOTES STATUS DETAILS   OFFICE NOTE from referring provider Internal 10.19.2021 Nicci Rene MBBS   OFFICE NOTES from other specialists N/A    DISCHARGE SUMMARY from hospital N/A    MEDICATION LIST Internal    LIVER BIOSPY (IF APPLICABLE)      PATHOLOGY REPORTS  N/A    IMAGING     ENDOSCOPY (IF AVAILABLE) N/A    COLONOSCOPY (IF AVAILABLE) N/A    ULTRASOUND LIVER N/A    CT OF ABDOMEN N/A    MRI OF LIVER N/A    FIBROSCAN, US ELASTOGRAPHY, FIBROSIS SCAN, MR ELASTOGRAPHY N/A    LABS     HEPATIC PANEL (LIVER PANEL) N/A    BASIC METABOLIC PANEL N/A    COMPLETE METABOLIC PANEL Internal 08.26.2021   COMPLETE BLOOD COUNT (CBC) Internal 08.26.2021   INTERNATIONAL NORMALIZED RATIO (INR) N/A    HEPATITIS C ANTIBODY N/A    HEPATITIS C VIRAL LOAD/PCR N/A    HEPATITIS C GENOTYPE N/A    HEPATITIS B SURFACE ANTIGEN N/A    HEPATITIS B SURFACE ANTIBODY N/A    HEPATITIS B DNA QUANT LEVEL N/A    HEPATITIS B CORE ANTIBODY N/A

## 2021-12-06 ENCOUNTER — OFFICE VISIT (OUTPATIENT)
Dept: GASTROENTEROLOGY | Facility: CLINIC | Age: 26
End: 2021-12-06
Attending: PEDIATRICS
Payer: MEDICARE

## 2021-12-06 ENCOUNTER — PRE VISIT (OUTPATIENT)
Dept: GASTROENTEROLOGY | Facility: CLINIC | Age: 26
End: 2021-12-06
Payer: MEDICARE

## 2021-12-06 VITALS
HEART RATE: 76 BPM | HEIGHT: 65 IN | BODY MASS INDEX: 18.66 KG/M2 | DIASTOLIC BLOOD PRESSURE: 70 MMHG | OXYGEN SATURATION: 91 % | WEIGHT: 112 LBS | SYSTOLIC BLOOD PRESSURE: 109 MMHG

## 2021-12-06 DIAGNOSIS — K76.9 CHRONIC LIVER DISEASE: ICD-10-CM

## 2021-12-06 DIAGNOSIS — Q20.8 FONTAN CIRCULATION PRESENT: Primary | ICD-10-CM

## 2021-12-06 DIAGNOSIS — K74.00 HEPATIC FIBROSIS, UNSPECIFIED: ICD-10-CM

## 2021-12-06 DIAGNOSIS — Q20.4 SINGLE COMMON VENTRICLE: ICD-10-CM

## 2021-12-06 PROCEDURE — 99203 OFFICE O/P NEW LOW 30 MIN: CPT | Performed by: INTERNAL MEDICINE

## 2021-12-06 ASSESSMENT — MIFFLIN-ST. JEOR: SCORE: 1421.1

## 2021-12-06 ASSESSMENT — PAIN SCALES - GENERAL: PAINLEVEL: NO PAIN (0)

## 2021-12-06 NOTE — LETTER
12/6/2021     RE: Carlin Stern  [address]  [facility] [address]    Dear Colleague,    Thank you for referring your patient, Carlin Stern, to the [facility] [place]. Please see a copy of my visit note below.    Date of Service: 12/6/2021     Referring Provider: Dr. Rene    Subjective:            Carlin Stern is a 26 year old male presenting for evaluation    History of Present Illness   Carlin Stern is a 26 year old male with past medical history of hypoplastic left heart syndrome s/p Meagan, Jamie, and Fontan procedures, developmental delay, and degenerative myopia of left eye who presents to [facility] care     Reviewed surgical intervention history todate with the family     Carlin denies confusion, inability to follow tasks, experiencing mental fog, swelling of his legs of feet, change in appetite, change in bowel habits.    Past Medical History:  Hypoplastic left heart  Myopia  Developmental delay    Surgical History:  Past Surgical History:   Procedure Laterality Date     HEART CATH CHILD N/A 3/11/2016    Procedure: HEART CATH CHILD;  Surgeon: Rashaun Marques MD;  Location:  OR       Social History:  Social History     Tobacco Use     Smoking status: Never Smoker     Smokeless tobacco: Never Used   Substance Use Topics     Alcohol use: None     Drug use: None        Family History:  Family History   Family history unknown: Yes   Adopted    Medications:  Current Outpatient Medications   Medication     aspirin (ASA) 81 MG tablet     digoxin (LANOXIN) 250 MCG tablet     lisinopril (ZESTRIL) 30 MG tablet     sertraline (ZOLOFT) 100 MG tablet     lisinopril (ZESTRIL) 20 MG tablet     No current facility-administered medications for this visit.       Review of Systems  A complete 10 point review of systems was asked and answered in the negative unless specifically commented upon in the HPI    Objective:         Vitals:    12/06/21 1437   BP:  "109/70   Pulse: 76   SpO2: 91%   Weight: 50.8 kg (112 lb)   Height: 1.645 m (5' 4.76\")     Body mass index is 18.78 kg/m .     Physical Exam    Vitals reviewed.   Constitutional: Well-developed, well-nourished, in no apparent distress.    HEENT: Normocephalic. no scleral icterus.   Neck/Lymph: Normal ROM, supple.  Cardiac:  Regular rate  Respiratory: pectus, Normal respiratory excursion   GI:  Abdomen soft, non-distended, non-tender. BS present. no shifting dullness. No overt hepatosplenomegaly.     Skin:  Skin is warm and dry. No rash noted.  no jaundice. no spider nevi noted.  no palmar erythema  Peripheral Vascular: no lower extremity edema. 2+ pulses in all extremities  Musculoskeletal:  ROM intact, normal muscle bulk.  Hyperextention of joints  Psychiatric: Normal mood and affect. Behavior is normal.  Neuro:  no asterixis, no tremor    Labs and Diagnostic tests:  reviewed    Imaging:  reviewed    Assessment and Plan:    Risk for liver disease    - In the setting of Fontan physiology, the patient is at risk for Fontan-associated Liver Disease.  This is a result of increased venous pressure and decreased cardiac output and hepatic venous drainage result in sinusoidal dilatation around the central veins. This causes congestion and hypoxia in the liver,   - Non-invasive and invasive markers enable diagnosis and evaluation of the fibrosis status in chronic liver disease; however, these markers have not been validated in FALD.  The severity of fibrosis correlates with the duration of the Fontan procedure and the central venous pressure.  - Regenerative nodules such as focal nodular hyperplasia (FNH) are frequently found.   - Cirrhosis is a risk factor for hepatocellular carcinoma (HCC), the annual risk of which is 1.5-5.0%.   - Although there is no consensus on the surveillance of patients with FALD, serial monitoring of the alpha fetoprotein level and imaging at 6-month intervals is required in patients with " cirrhosis.    RECOMMENDATIONS  - We will obtain screening labs and a complete abdominal US with doppler in March 2022 when undergoing cardiac cath to further risk stratify for presence of hepatic dysfunction.      Follow Up:   As needed; pending eval in March 2022     Thank you very much for the opportunity to participate in the care of this patient.  If you have any further questions, please don't hesitate to contact our office.    Thomas M. Leventhal, M.D.   of Medicine  Advanced & Transplant Hepatology  [facility]

## 2021-12-06 NOTE — Clinical Note
Could i trouble you to help arrange blood draw and imaging on same day as either the cardiac cath (blood draw) or the pulmonary consult in March 2022  TL

## 2021-12-06 NOTE — PROGRESS NOTES
"Date of Service: 12/6/2021     Referring Provider: Dr. Rene    Subjective:            Carlin Stern is a 26 year old male presenting for evaluation    History of Present Illness   Carlin Stenr is a 26 year old male with past medical history of hypoplastic left heart syndrome s/p [place], Jamie, and Fontan procedures, developmental delay, and degenerative myopia of left eye who presents to establish care     Reviewed surgical intervention history todate with the family     Carlin denies confusion, inability to follow tasks, experiencing mental fog, swelling of his legs of feet, change in appetite, change in bowel habits.    Past Medical History:  Hypoplastic left heart  Myopia  Developmental delay    Surgical History:  Past Surgical History:   Procedure Laterality Date     HEART CATH CHILD N/A 3/11/2016    Procedure: HEART CATH CHILD;  Surgeon: Rashaun Marques MD;  Location:  OR       Social History:  Social History     Tobacco Use     Smoking status: Never Smoker     Smokeless tobacco: Never Used   Substance Use Topics     Alcohol use: None     Drug use: None        Family History:  Family History   Family history unknown: Yes   Adopted    Medications:  Current Outpatient Medications   Medication     aspirin (ASA) 81 MG tablet     digoxin (LANOXIN) 250 MCG tablet     lisinopril (ZESTRIL) 30 MG tablet     sertraline (ZOLOFT) 100 MG tablet     lisinopril (ZESTRIL) 20 MG tablet     No current facility-administered medications for this visit.       Review of Systems  A complete 10 point review of systems was asked and answered in the negative unless specifically commented upon in the HPI    Objective:         Vitals:    12/06/21 1437   BP: 109/70   Pulse: 76   SpO2: 91%   Weight: 50.8 kg (112 lb)   Height: 1.645 m (5' 4.76\")     Body mass index is 18.78 kg/m .     Physical Exam    Vitals reviewed.   Constitutional: Well-developed, well-nourished, in no apparent distress.    HEENT: " Normocephalic. no scleral icterus.   Neck/Lymph: Normal ROM, supple.  Cardiac:  Regular rate  Respiratory: pectus, Normal respiratory excursion   GI:  Abdomen soft, non-distended, non-tender. BS present. no shifting dullness. No overt hepatosplenomegaly.     Skin:  Skin is warm and dry. No rash noted.  no jaundice. no spider nevi noted.  no palmar erythema  Peripheral Vascular: no lower extremity edema. 2+ pulses in all extremities  Musculoskeletal:  ROM intact, normal muscle bulk.  Hyperextention of joints  Psychiatric: Normal mood and affect. Behavior is normal.  Neuro:  no asterixis, no tremor    Labs and Diagnostic tests:  reviewed    Imaging:  reviewed    Assessment and Plan:    Risk for liver disease    - In the setting of Fontan physiology, the patient is at risk for Fontan-associated Liver Disease.  This is a result of increased venous pressure and decreased cardiac output and hepatic venous drainage result in sinusoidal dilatation around the central veins. This causes congestion and hypoxia in the liver,   - Non-invasive and invasive markers enable diagnosis and evaluation of the fibrosis status in chronic liver disease; however, these markers have not been validated in FALD.  The severity of fibrosis correlates with the duration of the Fontan procedure and the central venous pressure.  - Regenerative nodules such as focal nodular hyperplasia (FNH) are frequently found.   - Cirrhosis is a risk factor for hepatocellular carcinoma (HCC), the annual risk of which is 1.5-5.0%.   - Although there is no consensus on the surveillance of patients with FALD, serial monitoring of the alpha fetoprotein level and imaging at 6-month intervals is required in patients with cirrhosis.    RECOMMENDATIONS  - We will obtain screening labs and a complete abdominal US with doppler in March 2022 when undergoing cardiac cath to further risk stratify for presence of hepatic dysfunction.      Follow Up:   As needed; pending eval in  March 2022     Thank you very much for the opportunity to participate in the care of this patient.  If you have any further questions, please don't hesitate to contact our office.    Thomas M. Leventhal, M.D.   of Medicine  Advanced & Transplant Hepatology  [facility]

## 2021-12-27 ENCOUNTER — TELEPHONE (OUTPATIENT)
Dept: GASTROENTEROLOGY | Facility: CLINIC | Age: 26
End: 2021-12-27
Payer: MEDICARE

## 2021-12-27 NOTE — TELEPHONE ENCOUNTER
Scheduled pt for labs and imaging on 3/23 for Dr. Leventhal. Updated pt's father, Basilio, of date, times, and pt needs to be NPO 8 hrs prior to ultrasound. Basilio voiced understanding of plan.     Leena VALADEZ LPN  Hepatology Clinic      ----  Leventhal, Thomas Michael, MD [place], Shannon, LPN  Could I trouble you to help arrange blood draw and imaging on same day as either the cardiac cath (blood draw) or the pulmonary consult in March 2022   TL

## 2022-01-27 ENCOUNTER — TELEPHONE (OUTPATIENT)
Dept: CARDIOLOGY | Facility: CLINIC | Age: 27
End: 2022-01-27
Payer: MEDICARE

## 2022-01-27 NOTE — TELEPHONE ENCOUNTER
Contacted patient's fatherBasilio to discuss procedure scheduled on 3/24 and the need to move this procedure to 3/22 @ 8:30am with 6:30am arrival time.     Need to confirm when abdominal ultrasounds- ordered per leventhal will be rescheduled to with [facility] group.     Screening labs will be completed during heart catheterization.     Discussed:  Arrival time: per PAN  NPO times: per PAN  History & Physical : needs to be scheduled within 30 days of the procedure  Medications: will be updated by the ODILIA group a week before the procedure    COVID test: needs to be collected within 4 days of the procedure    All family's questions were answered. Encouraged family to call us back with any questions or concerns prior to the procedure.

## 2022-01-31 ENCOUNTER — PRE VISIT (OUTPATIENT)
Dept: PULMONOLOGY | Facility: CLINIC | Age: 27
End: 2022-01-31

## 2022-01-31 ENCOUNTER — TELEPHONE (OUTPATIENT)
Dept: CARDIOLOGY | Facility: CLINIC | Age: 27
End: 2022-01-31

## 2022-01-31 NOTE — TELEPHONE ENCOUNTER
Contacted patient's father to discuss procedure scheduled on 3/22 @ 8:30. [facility] rescheduled abdominal ultrasound to 3/22 @ 7:30am.    Discussed:  Arrival time: per PAN  NPO times: per PAN  History & Physical : Will be completed during clinic visit on 3/21    COVID test: scheduled for 3/18    All family's questions were answered. Encouraged family to call us back with any questions or concerns prior to the procedure.     Patient's father requested paper confirmation.

## 2022-02-16 DIAGNOSIS — Z11.59 ENCOUNTER FOR SCREENING FOR OTHER VIRAL DISEASES: Primary | ICD-10-CM

## 2022-03-17 ENCOUNTER — TELEPHONE (OUTPATIENT)
Dept: PEDIATRIC CARDIOLOGY | Facility: CLINIC | Age: 27
End: 2022-03-17
Payer: MEDICARE

## 2022-03-17 NOTE — TELEPHONE ENCOUNTER
Contacted patient's father, Basilio, to discuss procedure scheduled on 3/22. The patient has not been ill. Family denies, fever, runny nose, cough, vomiting, diarrhea, or rash.     Discussed:  Arrival time: per PAN  NPO times: per PAN  History & Physical : 3/21  Medications: Patient/family instructed to take aspirin and digoxin with a small sip of water prior to procedure and to not take any other medications morning of procedure. HOLD lisinopril for 2 days per Dr. Herrera.     COVID test: 3/18    Also discussed that no special soap is needed prior to the procedure and that MCCOLLUM will be calling the family as well.      All family's questions were answered. Encouraged family to call us back with any questions or concerns prior to the procedure.

## 2022-03-20 ENCOUNTER — ANESTHESIA EVENT (OUTPATIENT)
Dept: CARDIOLOGY | Facility: CLINIC | Age: 27
End: 2022-03-20
Payer: MEDICARE

## 2022-03-21 ENCOUNTER — DOCUMENTATION ONLY (OUTPATIENT)
Dept: OTHER | Facility: CLINIC | Age: 27
End: 2022-03-21
Payer: MEDICARE

## 2022-03-21 ENCOUNTER — TELEPHONE (OUTPATIENT)
Dept: PEDIATRIC CARDIOLOGY | Facility: CLINIC | Age: 27
End: 2022-03-21
Payer: MEDICARE

## 2022-03-21 NOTE — OR NURSING
Please see below copied e-mail communication regarding Guardianship:    Carlin Stern [phone]- parents are co-guardians but can make decisions independently. Risk usually advises that for a procedure/surgery consent it s good to ensure both co-guardians are aware even if only one signs the consent.         Marie Munson RN, BSN, BULL, [facility]  System Director Honoring Choices Advance Care Planning    [facility]

## 2022-03-21 NOTE — TELEPHONE ENCOUNTER
Received a fax that Carlin was approved to continue Digoxin 0.25mg tabs, one tab twice a day through 12/31/2022.

## 2022-03-22 ENCOUNTER — ANESTHESIA (OUTPATIENT)
Dept: CARDIOLOGY | Facility: CLINIC | Age: 27
End: 2022-03-22
Payer: MEDICARE

## 2022-03-22 ENCOUNTER — APPOINTMENT (OUTPATIENT)
Dept: GENERAL RADIOLOGY | Facility: CLINIC | Age: 27
End: 2022-03-22
Attending: PHYSICIAN ASSISTANT
Payer: MEDICARE

## 2022-03-22 ENCOUNTER — HOSPITAL ENCOUNTER (OUTPATIENT)
Facility: CLINIC | Age: 27
Setting detail: OBSERVATION
Discharge: HOME | End: 2022-03-23
Attending: PEDIATRICS | Admitting: PEDIATRICS
Payer: MEDICARE

## 2022-03-22 DIAGNOSIS — Z98.890 S/P FONTAN PROCEDURE: Primary | ICD-10-CM

## 2022-03-22 DIAGNOSIS — Q23.4 HLHS (HYPOPLASTIC LEFT HEART SYNDROME): ICD-10-CM

## 2022-03-22 DIAGNOSIS — K74.00 HEPATIC FIBROSIS, UNSPECIFIED: ICD-10-CM

## 2022-03-22 DIAGNOSIS — Q20.8 FONTAN CIRCULATION PRESENT: ICD-10-CM

## 2022-03-22 LAB
ABO/RH(D): NORMAL
ACT BLD: 220 SECONDS (ref 74–150)
ACT BLD: 241 SECONDS (ref 74–150)
ACT BLD: 245 SECONDS (ref 74–150)
ACT BLD: 245 SECONDS (ref 74–150)
ACT BLD: 267 SECONDS (ref 74–150)
ACT BLD: 297 SECONDS (ref 74–150)
AFP SERPL-MCNC: 3.2 UG/L (ref 0–8)
ALBUMIN SERPL-MCNC: 4.6 G/DL (ref 3.4–5)
ALP SERPL-CCNC: 58 U/L (ref 40–150)
ALT SERPL W P-5'-P-CCNC: 32 U/L (ref 0–70)
ANION GAP SERPL CALCULATED.3IONS-SCNC: 8 MMOL/L (ref 3–14)
ANTIBODY SCREEN: NEGATIVE
AST SERPL W P-5'-P-CCNC: 26 U/L (ref 0–45)
BASE EXCESS BLDA CALC-SCNC: -0.5 MMOL/L (ref -9.6–2)
BASE EXCESS BLDA CALC-SCNC: -1.2 MMOL/L (ref -9.6–2)
BASE EXCESS BLDA CALC-SCNC: -1.2 MMOL/L (ref -9.6–2)
BASE EXCESS BLDA CALC-SCNC: -1.7 MMOL/L (ref -9.6–2)
BASE EXCESS BLDA CALC-SCNC: 1.1 MMOL/L (ref -9.6–2)
BILIRUB DIRECT SERPL-MCNC: 0.1 MG/DL (ref 0–0.2)
BILIRUB SERPL-MCNC: 0.7 MG/DL (ref 0.2–1.3)
BLD PROD TYP BPU: NORMAL
BLD PROD TYP BPU: NORMAL
BLOOD COMPONENT TYPE: NORMAL
BLOOD COMPONENT TYPE: NORMAL
BUN SERPL-MCNC: 31 MG/DL (ref 7–30)
CA-I BLD-MCNC: 4.4 MG/DL (ref 4.4–5.2)
CA-I BLD-MCNC: 4.4 MG/DL (ref 4.4–5.2)
CA-I BLD-MCNC: 4.6 MG/DL (ref 4.4–5.2)
CA-I BLD-MCNC: 4.6 MG/DL (ref 4.4–5.2)
CA-I BLD-MCNC: 4.7 MG/DL (ref 4.4–5.2)
CALCIUM SERPL-MCNC: 9.5 MG/DL (ref 8.5–10.1)
CHLORIDE BLD-SCNC: 105 MMOL/L (ref 94–109)
CO2 SERPL-SCNC: 25 MMOL/L (ref 20–32)
CODING SYSTEM: NORMAL
CODING SYSTEM: NORMAL
CREAT SERPL-MCNC: 1.14 MG/DL (ref 0.66–1.25)
CROSSMATCH: NORMAL
CROSSMATCH: NORMAL
ERYTHROCYTE [DISTWIDTH] IN BLOOD BY AUTOMATED COUNT: 12.4 % (ref 10–15)
GFR SERPL CREATININE-BSD FRML MDRD: >90 ML/MIN/1.73M2
GLUCOSE BLD-MCNC: 102 MG/DL (ref 70–99)
GLUCOSE BLD-MCNC: 108 MG/DL (ref 70–99)
GLUCOSE BLD-MCNC: 111 MG/DL (ref 70–99)
GLUCOSE BLD-MCNC: 89 MG/DL (ref 70–99)
GLUCOSE BLD-MCNC: 90 MG/DL (ref 70–99)
GLUCOSE BLD-MCNC: 94 MG/DL (ref 70–99)
GLUCOSE BLDC GLUCOMTR-MCNC: 88 MG/DL (ref 70–99)
HCO3 BLDA-SCNC: 24 MMOL/L (ref 21–28)
HCO3 BLDA-SCNC: 24 MMOL/L (ref 21–28)
HCO3 BLDA-SCNC: 25 MMOL/L (ref 21–28)
HCT VFR BLD AUTO: 43.9 % (ref 40–53)
HGB BLD-MCNC: 12.5 G/DL (ref 13.3–17.7)
HGB BLD-MCNC: 12.8 G/DL (ref 13.3–17.7)
HGB BLD-MCNC: 13.4 G/DL (ref 13.3–17.7)
HGB BLD-MCNC: 14 G/DL (ref 13.3–17.7)
HGB BLD-MCNC: 14 G/DL (ref 13.3–17.7)
HGB BLD-MCNC: 15.7 G/DL (ref 13.3–17.7)
INR PPP: 1.14 (ref 0.86–1.14)
ISSUE DATE AND TIME: NORMAL
ISSUE DATE AND TIME: NORMAL
LACTATE BLD-SCNC: 0.4 MMOL/L
LACTATE BLD-SCNC: 0.5 MMOL/L
LACTATE BLD-SCNC: 0.5 MMOL/L
LACTATE BLD-SCNC: 0.6 MMOL/L
LACTATE BLD-SCNC: 0.8 MMOL/L
MCH RBC QN AUTO: 30.2 PG (ref 26.5–33)
MCHC RBC AUTO-ENTMCNC: 35.8 G/DL (ref 31.5–36.5)
MCV RBC AUTO: 84 FL (ref 78–100)
NT-PROBNP SERPL-MCNC: 242 PG/ML (ref 0–450)
O2/TOTAL GAS SETTING VFR VENT: 100 %
O2/TOTAL GAS SETTING VFR VENT: 21 %
O2/TOTAL GAS SETTING VFR VENT: 21 %
OXYHGB MFR BLDA: 90 % (ref 92–100)
OXYHGB MFR BLDA: 97 % (ref 92–100)
OXYHGB MFR BLDA: 98 % (ref 92–100)
PCO2 BLDA: 36 MM HG (ref 35–45)
PCO2 BLDA: 39 MM HG (ref 35–45)
PCO2 BLDA: 42 MM HG (ref 35–45)
PCO2 BLDA: 43 MM HG (ref 35–45)
PCO2 BLDA: 45 MM HG (ref 35–45)
PH BLDA: 7.35 [PH] (ref 7.35–7.45)
PH BLDA: 7.36 [PH] (ref 7.35–7.45)
PH BLDA: 7.37 [PH] (ref 7.35–7.45)
PH BLDA: 7.39 [PH] (ref 7.35–7.45)
PH BLDA: 7.44 [PH] (ref 7.35–7.45)
PLATELET # BLD AUTO: 73 10E3/UL (ref 150–450)
PO2 BLDA: 121 MM HG (ref 80–105)
PO2 BLDA: 250 MM HG (ref 80–105)
PO2 BLDA: 62 MM HG (ref 80–105)
PO2 BLDA: 62 MM HG (ref 80–105)
PO2 BLDA: 72 MM HG (ref 80–105)
POTASSIUM BLD-SCNC: 4.4 MMOL/L (ref 3.4–5.3)
POTASSIUM BLD-SCNC: 4.5 MMOL/L (ref 3.5–5)
POTASSIUM BLD-SCNC: 4.6 MMOL/L (ref 3.5–5)
POTASSIUM BLD-SCNC: 4.8 MMOL/L (ref 3.5–5)
POTASSIUM BLD-SCNC: 5.4 MMOL/L (ref 3.5–5)
POTASSIUM BLD-SCNC: 5.4 MMOL/L (ref 3.5–5)
PROT SERPL-MCNC: 7.8 G/DL (ref 6.8–8.8)
RBC # BLD AUTO: 5.2 10E6/UL (ref 4.4–5.9)
SODIUM BLD-SCNC: 135 MMOL/L (ref 133–144)
SODIUM BLD-SCNC: 135 MMOL/L (ref 133–144)
SODIUM BLD-SCNC: 136 MMOL/L (ref 133–144)
SODIUM BLD-SCNC: 136 MMOL/L (ref 133–144)
SODIUM BLD-SCNC: 137 MMOL/L (ref 133–144)
SODIUM SERPL-SCNC: 138 MMOL/L (ref 133–144)
SPECIMEN EXPIRATION DATE: NORMAL
TROPONIN I SERPL HS-MCNC: 8 NG/L
UNIT ABO/RH: NORMAL
UNIT ABO/RH: NORMAL
UNIT NUMBER: NORMAL
UNIT NUMBER: NORMAL
UNIT STATUS: NORMAL
UNIT STATUS: NORMAL
UNIT TYPE ISBT: 6200
UNIT TYPE ISBT: 6200
WBC # BLD AUTO: 4.3 10E3/UL (ref 4–11)

## 2022-03-22 PROCEDURE — 258N000003 HC RX IP 258 OP 636: Performed by: NURSE PRACTITIONER

## 2022-03-22 PROCEDURE — 37238 OPEN/PERQ PLACE STENT SAME: CPT | Performed by: PEDIATRICS

## 2022-03-22 PROCEDURE — 75716 ARTERY X-RAYS ARMS/LEGS: CPT | Mod: XU | Performed by: PEDIATRICS

## 2022-03-22 PROCEDURE — 250N000009 HC RX 250: Performed by: PEDIATRICS

## 2022-03-22 PROCEDURE — 999N000065 XR CHEST PORT 1 VIEW

## 2022-03-22 PROCEDURE — 36215 PLACE CATHETER IN ARTERY: CPT | Performed by: PEDIATRICS

## 2022-03-22 PROCEDURE — 93597 R&L HRT CATH CHD ABNL NT CNJ: CPT | Mod: 26 | Performed by: PEDIATRICS

## 2022-03-22 PROCEDURE — 82810 BLOOD GASES O2 SAT ONLY: CPT

## 2022-03-22 PROCEDURE — 85347 COAGULATION TIME ACTIVATED: CPT

## 2022-03-22 PROCEDURE — 36216 PLACE CATHETER IN ARTERY: CPT | Performed by: PEDIATRICS

## 2022-03-22 PROCEDURE — 84132 ASSAY OF SERUM POTASSIUM: CPT

## 2022-03-22 PROCEDURE — 82248 BILIRUBIN DIRECT: CPT

## 2022-03-22 PROCEDURE — 250N000011 HC RX IP 250 OP 636: Performed by: NURSE ANESTHETIST, CERTIFIED REGISTERED

## 2022-03-22 PROCEDURE — 80053 COMPREHEN METABOLIC PANEL: CPT

## 2022-03-22 PROCEDURE — 75710 ARTERY X-RAYS ARM/LEG: CPT | Mod: 26 | Performed by: PEDIATRICS

## 2022-03-22 PROCEDURE — 75820 VEIN X-RAY ARM/LEG: CPT | Mod: XU | Performed by: PEDIATRICS

## 2022-03-22 PROCEDURE — 86901 BLOOD TYPING SEROLOGIC RH(D): CPT | Performed by: NURSE PRACTITIONER

## 2022-03-22 PROCEDURE — 93567 NJX CAR CTH SPRVLV AORTGRPHY: CPT | Performed by: PEDIATRICS

## 2022-03-22 PROCEDURE — 33745 TIS CGEN CAR ANOMAL 1ST SHNT: CPT | Performed by: PEDIATRICS

## 2022-03-22 PROCEDURE — 258N000003 HC RX IP 258 OP 636: Performed by: NURSE ANESTHETIST, CERTIFIED REGISTERED

## 2022-03-22 PROCEDURE — 36215 PLACE CATHETER IN ARTERY: CPT | Mod: 59 | Performed by: PEDIATRICS

## 2022-03-22 PROCEDURE — 86923 COMPATIBILITY TEST ELECTRIC: CPT | Performed by: PEDIATRICS

## 2022-03-22 PROCEDURE — 250N000013 HC RX MED GY IP 250 OP 250 PS 637: Performed by: PHYSICIAN ASSISTANT

## 2022-03-22 PROCEDURE — C1887 CATHETER, GUIDING: HCPCS | Performed by: PEDIATRICS

## 2022-03-22 PROCEDURE — 250N000011 HC RX IP 250 OP 636: Performed by: ANESTHESIOLOGY

## 2022-03-22 PROCEDURE — 85610 PROTHROMBIN TIME: CPT

## 2022-03-22 PROCEDURE — 278N000051 HC OR IMPLANT GENERAL: Performed by: PEDIATRICS

## 2022-03-22 PROCEDURE — 82962 GLUCOSE BLOOD TEST: CPT

## 2022-03-22 PROCEDURE — 36010 PLACE CATHETER IN VEIN: CPT | Performed by: PEDIATRICS

## 2022-03-22 PROCEDURE — 75716 ARTERY X-RAYS ARMS/LEGS: CPT | Mod: 26 | Performed by: PEDIATRICS

## 2022-03-22 PROCEDURE — 999N000157 HC STATISTIC RCP TIME EA 10 MIN

## 2022-03-22 PROCEDURE — 83880 ASSAY OF NATRIURETIC PEPTIDE: CPT | Mod: GZ | Performed by: PHYSICIAN ASSISTANT

## 2022-03-22 PROCEDURE — C1725 CATH, TRANSLUMIN NON-LASER: HCPCS | Performed by: PEDIATRICS

## 2022-03-22 PROCEDURE — 93568 NJX CAR CTH NSLC P-ART ANGRP: CPT | Performed by: PEDIATRICS

## 2022-03-22 PROCEDURE — 75825 VEIN X-RAY TRUNK: CPT | Mod: 26 | Performed by: PEDIATRICS

## 2022-03-22 PROCEDURE — 84484 ASSAY OF TROPONIN QUANT: CPT

## 2022-03-22 PROCEDURE — 370N000017 HC ANESTHESIA TECHNICAL FEE, PER MIN: Performed by: PEDIATRICS

## 2022-03-22 PROCEDURE — 250N000009 HC RX 250: Performed by: NURSE ANESTHETIST, CERTIFIED REGISTERED

## 2022-03-22 PROCEDURE — P9016 RBC LEUKOCYTES REDUCED: HCPCS | Performed by: PEDIATRICS

## 2022-03-22 PROCEDURE — C1894 INTRO/SHEATH, NON-LASER: HCPCS | Performed by: PEDIATRICS

## 2022-03-22 PROCEDURE — 82105 ALPHA-FETOPROTEIN SERUM: CPT

## 2022-03-22 PROCEDURE — 75827 VEIN X-RAY CHEST: CPT | Mod: 26 | Performed by: PEDIATRICS

## 2022-03-22 PROCEDURE — 75605 CONTRAST EXAM THORACIC AORTA: CPT | Performed by: PEDIATRICS

## 2022-03-22 PROCEDURE — 250N000009 HC RX 250: Performed by: ANESTHESIOLOGY

## 2022-03-22 PROCEDURE — C1769 GUIDE WIRE: HCPCS | Performed by: PEDIATRICS

## 2022-03-22 PROCEDURE — 272N000001 HC OR GENERAL SUPPLY STERILE: Performed by: PEDIATRICS

## 2022-03-22 PROCEDURE — 250N000025 HC SEVOFLURANE, PER MIN: Performed by: PEDIATRICS

## 2022-03-22 PROCEDURE — C1760 CLOSURE DEV, VASC: HCPCS | Performed by: PEDIATRICS

## 2022-03-22 PROCEDURE — 93596 R&L HRT CATH CHD NML NT CNJ: CPT | Performed by: PEDIATRICS

## 2022-03-22 PROCEDURE — 255N000002 HC RX 255 OP 636: Performed by: PEDIATRICS

## 2022-03-22 PROCEDURE — 250N000011 HC RX IP 250 OP 636: Performed by: NURSE PRACTITIONER

## 2022-03-22 PROCEDURE — 85027 COMPLETE CBC AUTOMATED: CPT

## 2022-03-22 PROCEDURE — G0378 HOSPITAL OBSERVATION PER HR: HCPCS

## 2022-03-22 PROCEDURE — 71045 X-RAY EXAM CHEST 1 VIEW: CPT | Mod: 26 | Performed by: RADIOLOGY

## 2022-03-22 DEVICE — CLOSURE ANGIOSEAL 6FR 610130: Type: IMPLANTABLE DEVICE | Status: FUNCTIONAL

## 2022-03-22 DEVICE — IMPLANTABLE DEVICE: Type: IMPLANTABLE DEVICE | Status: FUNCTIONAL

## 2022-03-22 RX ORDER — DIGOXIN 250 MCG
250 TABLET ORAL 2 TIMES DAILY
Status: DISCONTINUED | OUTPATIENT
Start: 2022-03-22 | End: 2022-03-23 | Stop reason: HOSPADM

## 2022-03-22 RX ORDER — HYDROMORPHONE HYDROCHLORIDE 1 MG/ML
0.01 INJECTION, SOLUTION INTRAMUSCULAR; INTRAVENOUS; SUBCUTANEOUS EVERY 10 MIN PRN
Status: DISCONTINUED | OUTPATIENT
Start: 2022-03-22 | End: 2022-03-22 | Stop reason: HOSPADM

## 2022-03-22 RX ORDER — SODIUM CHLORIDE, SODIUM GLUCONATE, SODIUM ACETATE, POTASSIUM CHLORIDE AND MAGNESIUM CHLORIDE 526; 502; 368; 37; 30 MG/100ML; MG/100ML; MG/100ML; MG/100ML; MG/100ML
INJECTION, SOLUTION INTRAVENOUS CONTINUOUS PRN
Status: DISCONTINUED | OUTPATIENT
Start: 2022-03-22 | End: 2022-03-22

## 2022-03-22 RX ORDER — CEFAZOLIN SODIUM 1 G/3ML
1 INJECTION, POWDER, FOR SOLUTION INTRAMUSCULAR; INTRAVENOUS SEE ADMIN INSTRUCTIONS
Status: DISCONTINUED | OUTPATIENT
Start: 2022-03-22 | End: 2022-03-22 | Stop reason: HOSPADM

## 2022-03-22 RX ORDER — PROPOFOL 10 MG/ML
INJECTION, EMULSION INTRAVENOUS PRN
Status: DISCONTINUED | OUTPATIENT
Start: 2022-03-22 | End: 2022-03-22

## 2022-03-22 RX ORDER — DEXMEDETOMIDINE HYDROCHLORIDE 4 UG/ML
INJECTION, SOLUTION INTRAVENOUS PRN
Status: DISCONTINUED | OUTPATIENT
Start: 2022-03-22 | End: 2022-03-22

## 2022-03-22 RX ORDER — DEXAMETHASONE SODIUM PHOSPHATE 4 MG/ML
INJECTION, SOLUTION INTRA-ARTICULAR; INTRALESIONAL; INTRAMUSCULAR; INTRAVENOUS; SOFT TISSUE PRN
Status: DISCONTINUED | OUTPATIENT
Start: 2022-03-22 | End: 2022-03-22

## 2022-03-22 RX ORDER — ALBUTEROL SULFATE 0.83 MG/ML
2.5 SOLUTION RESPIRATORY (INHALATION)
Status: DISCONTINUED | OUTPATIENT
Start: 2022-03-22 | End: 2022-03-22 | Stop reason: HOSPADM

## 2022-03-22 RX ORDER — EPHEDRINE SULFATE 50 MG/ML
INJECTION, SOLUTION INTRAMUSCULAR; INTRAVENOUS; SUBCUTANEOUS PRN
Status: DISCONTINUED | OUTPATIENT
Start: 2022-03-22 | End: 2022-03-22

## 2022-03-22 RX ORDER — SODIUM CHLORIDE, SODIUM LACTATE, POTASSIUM CHLORIDE, CALCIUM CHLORIDE 600; 310; 30; 20 MG/100ML; MG/100ML; MG/100ML; MG/100ML
INJECTION, SOLUTION INTRAVENOUS CONTINUOUS PRN
Status: DISCONTINUED | OUTPATIENT
Start: 2022-03-22 | End: 2022-03-22

## 2022-03-22 RX ORDER — ASPIRIN 81 MG/1
81 TABLET, CHEWABLE ORAL DAILY
Status: DISCONTINUED | OUTPATIENT
Start: 2022-03-23 | End: 2022-03-23 | Stop reason: HOSPADM

## 2022-03-22 RX ORDER — ONDANSETRON 2 MG/ML
INJECTION INTRAMUSCULAR; INTRAVENOUS PRN
Status: DISCONTINUED | OUTPATIENT
Start: 2022-03-22 | End: 2022-03-22

## 2022-03-22 RX ORDER — FENTANYL CITRATE 50 UG/ML
INJECTION, SOLUTION INTRAMUSCULAR; INTRAVENOUS PRN
Status: DISCONTINUED | OUTPATIENT
Start: 2022-03-22 | End: 2022-03-22

## 2022-03-22 RX ORDER — HEPARIN SODIUM 1000 [USP'U]/ML
INJECTION, SOLUTION INTRAVENOUS; SUBCUTANEOUS PRN
Status: DISCONTINUED | OUTPATIENT
Start: 2022-03-22 | End: 2022-03-22

## 2022-03-22 RX ORDER — IODIXANOL 320 MG/ML
INJECTION, SOLUTION INTRAVASCULAR
Status: DISCONTINUED | OUTPATIENT
Start: 2022-03-22 | End: 2022-03-22 | Stop reason: HOSPADM

## 2022-03-22 RX ADMIN — MIDAZOLAM 1 MG: 1 INJECTION INTRAMUSCULAR; INTRAVENOUS at 08:55

## 2022-03-22 RX ADMIN — PHENYLEPHRINE HYDROCHLORIDE 60 MCG: 10 INJECTION INTRAVENOUS at 11:05

## 2022-03-22 RX ADMIN — PROPOFOL 40 MG: 10 INJECTION, EMULSION INTRAVENOUS at 10:55

## 2022-03-22 RX ADMIN — ROCURONIUM BROMIDE 20 MG: 10 INJECTION INTRAVENOUS at 10:55

## 2022-03-22 RX ADMIN — SODIUM CHLORIDE, POTASSIUM CHLORIDE, SODIUM LACTATE AND CALCIUM CHLORIDE: 600; 310; 30; 20 INJECTION, SOLUTION INTRAVENOUS at 10:10

## 2022-03-22 RX ADMIN — ONDANSETRON 4 MG: 2 INJECTION INTRAMUSCULAR; INTRAVENOUS at 08:55

## 2022-03-22 RX ADMIN — CEFAZOLIN 1500 MG: 1 INJECTION, POWDER, FOR SOLUTION INTRAMUSCULAR; INTRAVENOUS at 12:53

## 2022-03-22 RX ADMIN — ROCURONIUM BROMIDE 10 MG: 10 INJECTION INTRAVENOUS at 12:04

## 2022-03-22 RX ADMIN — FENTANYL CITRATE 100 MCG: 50 INJECTION, SOLUTION INTRAMUSCULAR; INTRAVENOUS at 08:55

## 2022-03-22 RX ADMIN — CEFAZOLIN 1500 MG: 1 INJECTION, POWDER, FOR SOLUTION INTRAMUSCULAR; INTRAVENOUS at 09:10

## 2022-03-22 RX ADMIN — ROCURONIUM BROMIDE 30 MG: 10 INJECTION INTRAVENOUS at 08:57

## 2022-03-22 RX ADMIN — DEXMEDETOMIDINE 12 MCG: 100 INJECTION, SOLUTION, CONCENTRATE INTRAVENOUS at 12:32

## 2022-03-22 RX ADMIN — HEPARIN SODIUM 5000 UNITS: 1000 INJECTION INTRAVENOUS; SUBCUTANEOUS at 09:58

## 2022-03-22 RX ADMIN — PROPOFOL 70 MG: 10 INJECTION, EMULSION INTRAVENOUS at 08:56

## 2022-03-22 RX ADMIN — DEXAMETHASONE SODIUM PHOSPHATE 4 MG: 4 INJECTION, SOLUTION INTRAMUSCULAR; INTRAVENOUS at 09:59

## 2022-03-22 RX ADMIN — DEXMEDETOMIDINE 12 MCG: 100 INJECTION, SOLUTION, CONCENTRATE INTRAVENOUS at 12:49

## 2022-03-22 RX ADMIN — SODIUM CHLORIDE, SODIUM GLUCONATE, SODIUM ACETATE, POTASSIUM CHLORIDE AND MAGNESIUM CHLORIDE: 526; 502; 368; 37; 30 INJECTION, SOLUTION INTRAVENOUS at 09:07

## 2022-03-22 RX ADMIN — Medication 5 MG: at 11:04

## 2022-03-22 RX ADMIN — MIDAZOLAM 3 MG: 1 INJECTION INTRAMUSCULAR; INTRAVENOUS at 08:41

## 2022-03-22 RX ADMIN — PHENYLEPHRINE HYDROCHLORIDE 0.2 MCG/KG/MIN: 10 INJECTION INTRAVENOUS at 11:21

## 2022-03-22 RX ADMIN — DIGOXIN 250 MCG: 250 TABLET ORAL at 20:14

## 2022-03-22 RX ADMIN — SODIUM CHLORIDE, POTASSIUM CHLORIDE, SODIUM LACTATE AND CALCIUM CHLORIDE: 600; 310; 30; 20 INJECTION, SOLUTION INTRAVENOUS at 11:21

## 2022-03-22 RX ADMIN — SUGAMMADEX 200 MG: 100 INJECTION, SOLUTION INTRAVENOUS at 12:50

## 2022-03-22 RX ADMIN — DEXMEDETOMIDINE 8 MCG: 100 INJECTION, SOLUTION, CONCENTRATE INTRAVENOUS at 13:00

## 2022-03-22 RX ADMIN — DEXMEDETOMIDINE 8 MCG: 100 INJECTION, SOLUTION, CONCENTRATE INTRAVENOUS at 12:41

## 2022-03-22 RX ADMIN — HEPARIN SODIUM 1500 UNITS: 1000 INJECTION INTRAVENOUS; SUBCUTANEOUS at 10:45

## 2022-03-22 ASSESSMENT — COLUMBIA-SUICIDE SEVERITY RATING SCALE - C-SSRS
2. HAVE YOU ACTUALLY HAD ANY THOUGHTS OF KILLING YOURSELF IN THE PAST MONTH?: NO
1. IN THE PAST MONTH, HAVE YOU WISHED YOU WERE DEAD OR WISHED YOU COULD GO TO SLEEP AND NOT WAKE UP?: NO

## 2022-03-22 NOTE — Clinical Note
injected and visualized utilizing power injector system.Rate (mL/sec) 23 Total Volume (mL) 16  Straight AP and Lateral Lt Subclavian Artery.

## 2022-03-22 NOTE — OR NURSING
PACU to Inpatient Nursing Handoff    Patient Carlin Stern is a 26 year old male who speaks English.   Procedure Procedure(s):  Heart Catheterization, angiography, possible balloon dilation and stenting of vessels, possible collateral closure   Surgeon(s) Primary: Rashaun Marques MD     No Known Allergies    Isolation  None     Past Medical History   has a past medical history of Congenital heart disease.    Anesthesia General   Dermatome Level     Preop Meds Not applicable   Nerve block Not applicable   Intraop Meds dexamethasone (Decadron)  dexmedetomidine (Precedex): 40 mcg total  fentanyl (Sublimaze): 100 mcg total  ondansetron (Zofran): last given at 4   Local Meds No   Antibiotics cefazolin (Ancef) - last given at 1253     Pain Patient Currently in Pain: denies   PACU meds  Not applicable   PCA / epidural No   Capnography     Telemetry ECG Rhythm: Sinus rhythm   Inpatient Telemetry Monitor Ordered? Yes        Labs Glucose Lab Results   Component Value Date     03/22/2022    GLC 94 03/22/2022    GLC 88 03/22/2022    GLC 84 08/26/2019       Hgb Lab Results   Component Value Date    HGB 12.8 03/22/2022    HGB 15.7 03/22/2022    HGB 15.1 08/26/2019       INR Lab Results   Component Value Date    INR 1.14 03/22/2022      PACU Imaging Completed - Chest - X-ray     Wound/Incision     CMS     intact   Equipment Not applicable   Other LDA Right Groin Interventional Procedure Access (Active)   Site Assessment WDL 03/22/22 1530   Hemostasis management Other (Comment) 03/22/22 1530   CMS Right Extremity WDL 03/22/22 1530   Dorsalis Pulse - Right Leg Normal 03/22/22 1530   Posterior Tibial Pulse - Right Leg Normal 03/22/22 1530   Number of days: 0       Right Groin Interventional Procedure Access (Active)   Site Assessment WDL 03/22/22 1530   Hemostasis management Other (Comment) 03/22/22 1530   CMS Right Extremity WDL 03/22/22 1530   Dorsalis Pulse - Right Leg Normal 03/22/22 1530   Posterior  Tibial Pulse - Right Leg Normal 03/22/22 1530   Number of days: 0        IV Access Peripheral IV 03/22/22 Right;Dorsal Hand (Active)   Site Assessment [facility] 03/22/22 1530   Line Status Infusing 03/22/22 1530   Dressing Intervention New dressing  03/22/22 0740   Phlebitis Scale 0-->no symptoms 03/22/22 1530   Infiltration Scale 0 03/22/22 1420   Number of days: 0       Peripheral IV 03/22/22 Left Lower forearm (Active)   Site Assessment [facility] 03/22/22 1530   Line Status Saline locked 03/22/22 1530   Phlebitis Scale 0-->no symptoms 03/22/22 1530   Infiltration Scale 0 03/22/22 1420   Number of days: 0       Right Groin Interventional Procedure Access (Active)   Site Assessment [facility] 03/22/22 1530   Hemostasis management Other (Comment) 03/22/22 1530   CMS Right Extremity WDL 03/22/22 1530   Dorsalis Pulse - Right Leg Normal 03/22/22 1530   Posterior Tibial Pulse - Right Leg Normal 03/22/22 1530   Number of days: 0       Right Groin Interventional Procedure Access (Active)   Site Assessment [facility] 03/22/22 1530   Hemostasis management Other (Comment) 03/22/22 1530   CMS Right Extremity WDL 03/22/22 1530   Dorsalis Pulse - Right Leg Normal 03/22/22 1530   Posterior Tibial Pulse - Right Leg Normal 03/22/22 1530   Number of days: 0      Blood Products Not applicable EBL 60 mL   Intake/Output Date 03/22/22 0700 - 03/23/22 0659   Shift 6842-0371 6832-9682 7119-9121 24 Hour Total   INTAKE   I.V. 1300   1300   Shift Total(mL/kg) 1300(25.54)   1300(25.54)   OUTPUT   Urine 590   590   Shift Total(mL/kg) 590(11.59)   590(11.59)   Weight (kg) 50.9 50.9 50.9 50.9      Drains / Barrera Right Groin Interventional Procedure Access (Active)   Site Assessment [facility] 03/22/22 1530   Hemostasis management Other (Comment) 03/22/22 1530   CMS Right Extremity WDL 03/22/22 1530   Dorsalis Pulse - Right Leg Normal 03/22/22 1530   Posterior Tibial Pulse - Right Leg Normal 03/22/22 1530   Number of days: 0       Right Groin Interventional Procedure Access  (Active)   Site Assessment WDL 03/22/22 1530   Hemostasis management Other (Comment) 03/22/22 1530   CMS Right Extremity WDL 03/22/22 1530   Dorsalis Pulse - Right Leg Normal 03/22/22 1530   Posterior Tibial Pulse - Right Leg Normal 03/22/22 1530   Number of days: 0      Time of void PreOp      PostOp  garcia removed around 1300    Diapered? No   Bladder Scan      mL (03/22/22 1500)  tolerating sips and water     Vitals    B/P: 106/64  T: 97.4  F (36.3  C)    Temp src: Oral  P:  Pulse: 68 (03/22/22 1545)          R: 15  O2:  SpO2: 95 %    O2 Device: None (Room air) (03/22/22 1545)    Oxygen Delivery: 1 LPM (03/22/22 1500)      Family/support present father - Chaitanya   Patient belongings Returned to patient in PACU    Patient transported on cart   DC meds/scripts (obs/outpt) Not applicable   Inpatient Pain Meds Released? Yes       Special needs/considerations None   Tasks needing completion US scheduled for tomorrow am, ECHO to be done tomorrow morning before discharge.        Juanita Carcamo RN  [facility]COM [id number]

## 2022-03-22 NOTE — Clinical Note
Family has been updated.   Patient states she spoke to Dr. Hall on the phone and he informed her a follow up in our office is not necessary.

## 2022-03-22 NOTE — Clinical Note
Catheter inserted over wire in the right femoral artery. Leticia over the glidewire then glidewire removed.

## 2022-03-22 NOTE — Clinical Note
Guidewire inserted into the right femoral artery. Then removed after repositioning to Rt. Subclavian.

## 2022-03-22 NOTE — BRIEF OP NOTE
[facility]  BRIEF POST-PROCEDURE NOTE    Pre-procedure diagnosis 1. HLHS, s/p non-fenestrated extracardiac Fontan   -Mild narrowing of proximal segment LPA  2. History of veno-venous collaterals  3. Developmental delay   Post-procedure diagnosis same   Procedure 1. Right and retrograde left heart cath  2. Fluid challenge with 700 ml bolus NS  3. Angiography  4. Stenting of Fontan conduit with 12 x 36 mm EV3 Max LD stent on 20 mm BIB and post dilated with 22 mm [place] balloon    Staff Dr. Herrera   Assistant(s) Sapna Rainey PA-C   Anesthesia general anesthesia and local with 1% lidocaine   Access 12F RFV , 5F RFA    Specimens None   IV contrast 231 mL   Heparinized Yes   Blood loss 60 mL   Complications Hemoptysis during case, managed with supportive cares by anesthesia staff. ~50 mL sanguinous output from ETT. Bedside bronchoscopy performed, see their note for full details. At end of case, small amount of hemoptysis again seen, but patient successfully extubated to CPAP and transferred to PACU in stable condition. Hemoglobin was checked and stable throughout the case.      Preliminary findings:    Low filling pressures and systemic blood pressures (systolics in the 70s) on initial hemodynamics, mean SVC pressure of 8 mmHg and Fontan pressure of 12 mmHg    Patient given 700 ml bolus of normal saline, with improvement - mean Fontan pressure of 14 mmHg with 1 mmHg gradient into both the RPA and LPA. No gradient into SVC, IVC, hepatic vein. Normal transhepatic gradient of 1 mmHg     Normal transpulmonary gradient of 4 mmHg, normal PVR of 1.77 indexed Woods units    Low-normal cardiac index of 2.26 L/min/m2    There was a 4 mmHg gradient from ascending to descending thoracic aorta, no intervention performed    No evidence of significant AP collaterals    Single V-V collateral from left innominate vein to left pulmonary vein, left as pop-off    Narrowing of the proximal Fontan conduit, which was  successful stented using a 12 x 36 mm EV3 Max LD stent on a 20 mm BIB and post dilated with a 22 mm Lisa balloon      Successful closure of arterial access site using 6 Fr Angioseal closure device    Successful closure of venous access site using Perclose closure device    Plan:    To PACU for recovery from anesthesia    2 hours of bedrest (see above)    Watch the right groin site closely for any bleeding, swelling, redness, discharge, or change in color/temperature/sensation of the right leg    CXR prior to discharge from PACU    Please keep NPO at midnight for abdominal ultrasound at 8:00 AM tomorrow morning - may have normal diet until midnight     Echo tomorrow morning    Resume home medications, increase Aspirin dose to daily (PTA on 4 days per week)    Follow up with PCP in 1 week, Dr. Haque in 1 month       Sapna Rainey PA-C  Pediatric Cardiology  [facility]

## 2022-03-22 NOTE — Clinical Note
injectedRate (mL/sec) 18 Total Volume (mL) 14  Straight AP and Lateral - Rt subclavian artery angio.

## 2022-03-22 NOTE — PLAN OF CARE
"PRIMARY DIAGNOSIS: \"GENERIC\" NURSING  OUTPATIENT/OBSERVATION GOALS TO BE MET BEFORE DISCHARGE:  ADLs back to baseline: Yes    Activity and level of assistance: Ambulating independently.    Pain status: Pain free.    Return to near baseline physical activity: Yes     Discharge Planner Nurse   Safe discharge environment identified: Yes  Barriers to discharge: Yes       Entered by: CATHIE PIMENTEL 03/22/2022 6:32 PM    ~No bleeding, hematoma or neurovascular compromise 4 hours post procedure and after bedrest is completed. Completed  ~Pain is controlled and tolerating oral intake. Painfree, eating dinner  ~Patient is voiding. Pt voided upon arrival to the floor  ~Stable vital signs. Vitals stable   ~Post procedure tests are completed and results acceptable (if applicable).Will have test in the am.             Please review provider order for any additional goals.   Nurse to notify provider when observation goals have been met and patient is ready for discharge.Goal Outcome Evaluation:                      "

## 2022-03-22 NOTE — Clinical Note
dry, intact, no bleeding and no hematoma. Angioseal used in Right femoral artery. Percclosed R. Femoral vein.

## 2022-03-22 NOTE — ANESTHESIA CARE TRANSFER NOTE
Patient: Carlin Stern    Procedure: Procedure(s):  Heart Catheterization, angiography, possible balloon dilation and stenting of vessels, possible collateral closure       Diagnosis: Fontan  Diagnosis Additional Information: No value filed.    Anesthesia Type:   General     Note:    Oropharynx: oropharynx clear of all foreign objects and spontaneously breathing  Level of Consciousness: drowsy and awake  Patient oxygen source: BIpap.  Level of Supplemental Oxygen (L/min / FiO2): 6  Independent Airway: airway patency satisfactory and stable  Dentition: dentition unchanged  Vital Signs Stable: post-procedure vital signs reviewed and stable  Report to RN Given: handoff report given  Patient transferred to: PACU    Handoff Report: Identifed the Patient, Identified the Reponsible Provider, Reviewed the pertinent medical history, Discussed the surgical course, Reviewed Intra-OP anesthesia mangement and issues during anesthesia, Set expectations for post-procedure period and Allowed opportunity for questions and acknowledgement of understanding      Vitals:  Vitals Value Taken Time   BP 88/38 03/22/22 1329   Temp     Pulse 59 03/22/22 1334   Resp 9 03/22/22 1334   SpO2 93 % 03/22/22 1334   Vitals shown include unvalidated device data.    Electronically Signed By: ALEXIS Hayes CRNA  March 22, 2022  1:35 PM

## 2022-03-22 NOTE — ANESTHESIA PREPROCEDURE EVALUATION
Anesthesia Pre-Procedure Evaluation    Patient: Carlin Stern   MRN: [id number] : 1995        Procedure : Procedure(s):  Heart Catheterization, angiography, possible balloon dilation and stenting of vessels, possible collateral closure          Past Medical History:   Diagnosis Date     Congenital heart disease     HLHS      Past Surgical History:   Procedure Laterality Date     CARDIAC SURGERY      [place], Jamie, Fontan     HEART CATH CHILD N/A 3/11/2016    Procedure: HEART CATH CHILD;  Surgeon: Rashaun Marques MD;  Location: [facility] OR      No Known Allergies   Social History     Tobacco Use     Smoking status: Never Smoker     Smokeless tobacco: Never Used   Substance Use Topics     Alcohol use: Not on file      Wt Readings from Last 1 Encounters:   22 50.9 kg (112 lb 3.4 oz)        Anesthesia Evaluation   Pt has had prior anesthetic. Type: General.    No history of anesthetic complications       ROS/MED HX  ENT/Pulmonary: Comment:   - Baseline saturation in low 90ies      Neurologic: Comment:   - Neuromuscular scoliosis of thoracolumbar area    (+) Developmental delay (Parents are legal guardians),     Cardiovascular: Comment:   TTE 10/18/2021: HLHS, s/p [place], Jamie and Fontan operations. Large muscular VSD with bidirectional (systolic right to left) flow. 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 Qualitatively mild/moderately depressed RV systolic function. Normal appearance and motion of systemic tricuspid valve, with moderate central regurgitation. Low-velocity phasic flow in SVC, IVC, and proximal Fontan conduit; branch PAs not well seen. Unobstructed neoaortic outflow with mild insufficiency. Aortic arch not well seen. Normal pulsatile flow in descending abdominal aorta. No effusion. No significant change from last echocardiogram.    - Aspirin, Digoxin, Lisinopril    (+) -----congenital heart disease (HLHS, s/p single ventricle palliation)  (-) murmur    METS/Exercise Tolerance: >4 METS    Hematologic:  - neg hematologic  ROS     Musculoskeletal: Comment:   - Scoliosis      GI/Hepatic:       Renal/Genitourinary:  - neg Renal ROS     Endo:  - neg endo ROS     Psychiatric/Substance Use:       Infectious Disease:  - neg infectious disease ROS     Malignancy:  - neg malignancy ROS     Other:            Physical Exam    Airway        Mallampati: I   TM distance: > 3 FB   Neck ROM: full   Mouth opening: > 3 cm    Respiratory Devices and Support         Dental  no notable dental history         Cardiovascular          Rhythm and rate: regular and normal (-) no murmur    Pulmonary           breath sounds clear to auscultation       Other findings: Developmental delay    OUTSIDE LABS:  CBC:   Lab Results   Component Value Date    WBC 4.4 08/26/2019    WBC 4.4 12/07/2015    HGB 15.1 08/26/2019    HGB 13.5 03/11/2016    HCT 46.6 08/26/2019    HCT 40.0 12/07/2015    PLT 78 (L) 08/26/2019    PLT 91 (L) 12/07/2015     BMP:   Lab Results   Component Value Date     08/26/2019     12/07/2015    POTASSIUM 4.3 08/26/2019    POTASSIUM 4.1 03/11/2016    CHLORIDE 101 08/26/2019    CHLORIDE 106 12/07/2015    CO2 25 08/26/2019    CO2 27 12/07/2015    BUN 25 08/26/2019    BUN 25 (H) 12/07/2015    CR 1.06 08/26/2019    CR 1.02 (H) 03/11/2016    GLC 88 03/22/2022    GLC 84 08/26/2019     COAGS: No results found for: PTT, INR, FIBR  POC: No results found for: BGM, HCG, HCGS  HEPATIC:   Lab Results   Component Value Date    ALBUMIN 4.6 08/26/2019    PROTTOTAL 8.0 08/26/2019    ALT 28 08/26/2019    AST 23 08/26/2019    ALKPHOS 76 08/26/2019    BILITOTAL 0.5 08/26/2019     OTHER:   Lab Results   Component Value Date    LESA 9.4 08/26/2019       Anesthesia Plan    ASA Status:  3   NPO Status:  NPO Appropriate    Anesthesia Type: General.     - Airway: ETT   Induction: Intravenous.   Maintenance: Balanced.   Techniques and Equipment:     - Lines/Monitors: 2nd IV, NIRS      Consents    Anesthesia Plan(s) and associated risks, benefits, and realistic alternatives discussed. Questions answered and patient/representative(s) expressed understanding.    - Discussed:     - Discussed with:  Patient, Legal Guardian (Father)      - Extended Intubation/Ventilatory Support Discussed: No.      - Patient is DNR/DNI Status: No    Use of blood products discussed: No .     Postoperative Care    Pain management: IV analgesics.   PONV prophylaxis: Ondansetron (or other 5HT-3), Dexamethasone or Solumedrol     Comments:    Other Comments: Discussed common and potentially harmful risks for General Anesthesia.   These risks include, but were not limited to: Conversion to secured airway, Sore throat, Airway injury, Dental injury, Aspiration, Respiratory issues (Bronchospasm, Laryngospasm, Desaturation), Hemodynamic issues (Arrhythmia, Hypotension, Ischemia), Potential long term consequences of respiratory and hemodynamic issues, PONV, Emergence delirium/agitation, Potential overnight admission  Risks of invasive procedures were not discussed: N/A    All questions were answered.       H&P reviewed: Unable to attach H&P to encounter due to EHR limitations. H&P Update: appropriate H&P reviewed, patient examined. No interval changes since H&P (within 30 days).         Billy Rice MD

## 2022-03-22 NOTE — Clinical Note
injected and visualized utilizing power injector system.Rate (mL/sec) 25 Total Volume (mL) 28  AP - Bangl[place]hi 10  LATERAL - Bangladeshi 100, CAU 10 Fontan to RPA/LPA

## 2022-03-22 NOTE — Clinical Note
injected utilizing power injector system.Rate (mL/sec) 22 Total Volume (mL) 18  AP - MADELEINE 10  LATERAL - MADELEINE 100, CAU 10 SVC to LPA

## 2022-03-22 NOTE — ANESTHESIA PROCEDURE NOTES
Airway       Patient location during procedure: OR       Procedure Start/Stop Times: 3/22/2022 9:00 AM  Staff -        Anesthesiologist:  Billy Rice MD       CRNA: Alissa Faith APRN CRNA       Performed By: CRNA  Consent for Airway        Urgency: elective  Indications and Patient Condition       Indications for airway management: marty-procedural       Induction type:intravenous       Mask difficulty assessment: 1 - vent by mask    Final Airway Details       Final airway type: endotracheal airway       Successful airway: ETT - single and Oral  Endotracheal Airway Details        ETT size (mm): 7.0       Cuffed: yes       Successful intubation technique: direct laryngoscopy       DL Blade Type: MAC 3       Grade View of Cords: 1       Adjucts: stylet       Position: Right       Measured from: gums/teeth       Secured at (cm): 21       Bite block used: None    Post intubation assessment        Placement verified by: capnometry, equal breath sounds and chest rise        Number of attempts at approach: 1       Secured with: silk tape       Ease of procedure: easy       Dentition: Intact and Unchanged

## 2022-03-22 NOTE — PHARMACY-ADMISSION MEDICATION HISTORY
Admission Medication History Completed by Pharmacy    See [facility] Admission Navigator for allergy information, preferred outpatient pharmacy, prior to admission medications and immunization status.     Medication History Sources: chart review, RN reported last doses    Changes made to PTA medication list (reason):    Added: None    Deleted: lisinopril 20 mg tablet (dose increased)    Changed: aspirin daily -> 4 times weekly (per chart review)    Additional Information:    None    Prior to Admission medications    Medication Sig Last Dose Taking? Auth Provider   aspirin (ASA) 81 MG tablet Take 1 tablet (81 mg) by mouth daily  Patient taking differently: Take 81 mg by mouth four times a week  3/22/2022 at 0500 Yes Nicci Rene MBBS   digoxin (LANOXIN) 250 MCG tablet Take 1 tablet (250 mcg) by mouth 2 times daily 3/22/2022 at 0500 Yes Nicci Rene MBBS   lisinopril (ZESTRIL) 30 MG tablet Take 1 tablet (30 mg) by mouth daily Past Week at Unknown time Yes Nicci Rene MBBS   sertraline (ZOLOFT) 100 MG tablet Take 100 mg by mouth daily Past Week at Unknown time Yes Reported, Patient       Date completed: 03/22/22    Medication history completed by:   Kerline Johnson, SundayD, BCPPS

## 2022-03-22 NOTE — Clinical Note
ascending aorta Cine(s)  injected and visualized utilizing power injector system.Rate (mL/sec) 25 Total Volume (mL) 35  AP - [place] 25   LAT - STRAIGHT

## 2022-03-22 NOTE — ANESTHESIA POSTPROCEDURE EVALUATION
"Patient: Carlin Stern    Procedure: Procedure(s):  Heart Catheterization, angiography, possible balloon dilation and stenting of vessels, possible collateral closure       Anesthesia Type:  General    Note:  Disposition: Admission   Postop Pain Control: Uneventful            Sign Out: Well controlled pain   PONV: No   Neuro/Psych: Uneventful            Sign Out: Acceptable/Baseline neuro status   Airway/Respiratory:             Events: Respiratory failure            Sign Out: Acceptable/Baseline resp. status   CV/Hemodynamics: Uneventful            Sign Out: Acceptable CV status; No obvious hypovolemia; No obvious fluid overload   Other NRE: NONE   DID A NON-ROUTINE EVENT OCCUR? YES    Event details/Postop Comments:  - Uneventful induction and intubation, borderline hypotensive in the beginning, but arterial pressures measured via groin access in acceptable ranges. Patient was heparinized.  - Noted fairly low tidal volumes and borderline saturations after pressures were measured. A recruitment maneuver was administered with 35 cm H2O continuously for about 30 seconds. Patient did cough under anesthesia and shortly after a small amount of blood was noted in the ETT, followed by brief desaturation, recovered quickly.  - When suctioning the endotracheal tube, a moderate amount of blood was suctioned x2  - Fluoro was used to rule put an obvious pneumothrorax  - Called FOB to the room to assess bleeding situation. When inserting the FOB, no obvious source of bleeding was found, but airway had some fresh blood still present. The only area with a small area of blood pooling appeared to be the right upper lobe, but after suctioning, no \"refilling\" of the bronchus with blood occurred while observing  - Continuation of procedure without incident and with PEEP of 10 cmH2O, able to wean to RA while intubated without obvious desaturation  - A confirmatory FOB was performed at the end of procedure with no obvious no blood in " the Trachea or main bronchi.  - Patient mobilized some old blood during emergence when coughing with extubation, suctioned x2  - No obvious bleeding after extubation, but significant Rhonchi heard. Patient does not cough up blood. Remains somewhat sedated and slightly obstructed after extubation with mild desaturation into the mid 80ies  - Decision to place patient on BIPAP (initial setting 16/8, FiO2 100%) and transport to PACU. Able to rapidly wean BIPAP, patient was witched to oxygen face mask after 30 minutes, and nasal cannula after 40 minutes  - CXR showed atelectasis/blood in right upper lobe likely related to the bleeding event. Incentive spirometry started while patient on 1 L/min of O2 per NC, desaturates to high 80ies on RA at this point, but stays well in mid 90ies on 1 L/min O2 per NC.  - Able to wean Oxygen off to RA while maintaining baseline sats (93-95%). Intermittent self limiting and short dips in the high 80ies. Patient awake and comfortable, discussed event in detail with father of the patient.  - Able to transfer to floor on SpO2 monitoring              Last vitals:  Vitals Value Taken Time   BP 95/46 03/22/22 1445   Temp 36.5  C (97.7  F) 03/22/22 1400   Pulse 65 03/22/22 1454   Resp 14 03/22/22 1430   SpO2 90 % 03/22/22 1454   Vitals shown include unvalidated device data.    Electronically Signed By: Billy Rice MD  March 22, 2022  2:56 PM

## 2022-03-22 NOTE — Clinical Note
injected utilizing power injector system.Rate (mL/sec) 18 Total Volume (mL) 15  AP - MADELEINE 10  LATERAL - MADELEINE 100, CAU 10. Left PA

## 2022-03-22 NOTE — INTERVAL H&P NOTE
I have reviewed the surgical (or preoperative) H&P that is linked to this encounter, and examined the patient. There are no significant changes    Clinical Conditions Present on Arrival:  [email or web address]@                 # Platelet Defect: home medication list includes an antiplatelet medication

## 2022-03-23 ENCOUNTER — HOSPITAL ENCOUNTER (OUTPATIENT)
Dept: ULTRASOUND IMAGING | Facility: CLINIC | Age: 27
Discharge: HOME | End: 2022-03-23
Attending: INTERNAL MEDICINE | Admitting: PEDIATRICS
Payer: MEDICARE

## 2022-03-23 ENCOUNTER — APPOINTMENT (OUTPATIENT)
Dept: CARDIOLOGY | Facility: CLINIC | Age: 27
End: 2022-03-23
Attending: PHYSICIAN ASSISTANT
Payer: MEDICARE

## 2022-03-23 ENCOUNTER — APPOINTMENT (OUTPATIENT)
Dept: GENERAL RADIOLOGY | Facility: CLINIC | Age: 27
End: 2022-03-23
Attending: PHYSICIAN ASSISTANT
Payer: MEDICARE

## 2022-03-23 VITALS
BODY MASS INDEX: 18.7 KG/M2 | HEIGHT: 65 IN | RESPIRATION RATE: 22 BRPM | SYSTOLIC BLOOD PRESSURE: 117 MMHG | TEMPERATURE: 98.3 F | OXYGEN SATURATION: 94 % | HEART RATE: 80 BPM | DIASTOLIC BLOOD PRESSURE: 56 MMHG | WEIGHT: 112.21 LBS

## 2022-03-23 PROCEDURE — G0378 HOSPITAL OBSERVATION PER HR: HCPCS

## 2022-03-23 PROCEDURE — 250N000013 HC RX MED GY IP 250 OP 250 PS 637: Performed by: PHYSICIAN ASSISTANT

## 2022-03-23 PROCEDURE — 76700 US EXAM ABDOM COMPLETE: CPT

## 2022-03-23 PROCEDURE — 71045 X-RAY EXAM CHEST 1 VIEW: CPT | Mod: 26 | Performed by: RADIOLOGY

## 2022-03-23 PROCEDURE — 93303 ECHO TRANSTHORACIC: CPT | Mod: 26 | Performed by: PEDIATRICS

## 2022-03-23 PROCEDURE — 71045 X-RAY EXAM CHEST 1 VIEW: CPT

## 2022-03-23 PROCEDURE — 93320 DOPPLER ECHO COMPLETE: CPT | Mod: 26 | Performed by: PEDIATRICS

## 2022-03-23 PROCEDURE — 99217 PR OBSERVATION CARE DISCHARGE: CPT | Performed by: PHYSICIAN ASSISTANT

## 2022-03-23 PROCEDURE — 93975 VASCULAR STUDY: CPT | Mod: 26 | Performed by: RADIOLOGY

## 2022-03-23 PROCEDURE — 93325 DOPPLER ECHO COLOR FLOW MAPG: CPT

## 2022-03-23 PROCEDURE — 93325 DOPPLER ECHO COLOR FLOW MAPG: CPT | Mod: 26 | Performed by: PEDIATRICS

## 2022-03-23 RX ORDER — CLOPIDOGREL BISULFATE 75 MG/1
75 TABLET ORAL
Qty: 90 TABLET | Refills: 0 | Status: SHIPPED | OUTPATIENT
Start: 2022-03-23 | End: 2024-08-30

## 2022-03-23 RX ADMIN — LISINOPRIL 30 MG: 20 TABLET ORAL at 07:39

## 2022-03-23 RX ADMIN — ASPIRIN 81 MG CHEWABLE TABLET 81 MG: 81 TABLET CHEWABLE at 07:39

## 2022-03-23 RX ADMIN — SERTRALINE HYDROCHLORIDE 100 MG: 50 TABLET ORAL at 07:39

## 2022-03-23 RX ADMIN — DIGOXIN 250 MCG: 250 TABLET ORAL at 07:39

## 2022-03-23 NOTE — DISCHARGE INSTRUCTIONS
"                               [facility]  Cardiac Catheterization & Electrophysiology Laboratory  Discharge Instructions    Carlin Stern MRN# [id number]   YOB: 1995 Age: 26 year old     Date of Admission:  3/22/2022  Date of Discharge:  3/23/2022  Physician:   Rashaun Bryan MD  Primary Care Provider: Basilio Rahman           Diagnoses:     Hypoplastic left heart syndrome, s/p non-fenestrated Fontan           Procedures, Findings, Outcomes, Recommendations, Plans:     Heart catheterization, angiography, stenting of Fontan conduit     Mild hemoptysis (bleeding from ET tube) during procedure. Managed with supportive cares by anesthesia. Followed chest xrays during observation    Follow up echo showed stable stent placement     Increase Aspirin dosing to daily and start taking Plavix 75 mg daily in the evening with dinner           Pending Results:     None            Discharge Weight and Vitals:   Blood pressure 117/56, pulse 80, temperature 98.3  F (36.8  C), temperature source Axillary, resp. rate 22, height 1.657 m (5' 5.25\"), weight 50.9 kg (112 lb 3.4 oz), SpO2 94 %.         Follow-Up Appointments:   Primary Care Provider: 1 week(s)  Primary Cardiologist:  1 month(s)  Rashaun Bryan MD: as needed         Wound Care, Monitoring, and Other Instructions:     Watch the right groin site closely for any bleeding, swelling, redness, discharge, or change in color/temperature/sensation of the right leg     Call immediately if there is bleeding or fever    Keep the site clean and dry    You may leave the site uncovered; if you want to cover it with a band-aid be sure to change the band-aid any time it gets wet or dirty    Avoid vigorous activity for 72 hours to reduce the risk of bleeding from the site    Do not soak the site (bathe or swim) until skin sites are healed; okay to shower or sponge-bathe after 24 hours    If " you have any questions about the site, either your primary care provider or your cardiologist can examine it    To reach [facility] cardiologist at any time please call [phone] (M-F 7:30 AM- 4:30 PM) or [phone] and ask for the on-call pediatric cardiologist (anytime)

## 2022-03-23 NOTE — PLAN OF CARE
O2 saturations dipping into the high 80's late this evening, pt placed on nasal cannula 2 LPM, was able to wean back to room air by 0300. Sats 90-93% on room air. Cath site in right groin CDI. Pt warm and well perfused with good pulses. HR 70's, no telemetry changes. Pt denies any pain, but awake all night, just resting in bed. Eating and drinking well this evening, NPO overnight for abdominal ultrasound this AM. Voiding. Dad at bedside, continue to monitor.           Goal Outcome Evaluation:    Plan of Care Reviewed With: patient, father     Overall Patient Progress: no change

## 2022-03-23 NOTE — PLAN OF CARE
Goal Outcome Evaluation:    Plan of Care Reviewed With: father     Overall Patient Progress: improving, adequate for discharge    Seen by Sapna NP to take off right groin dressing, father had questions and was answered by NP during this time, discharge instructions given to father, pointed out medication  at preferred pharmacy, new medication to be taken today at dinner, follow up appointment at Mar 28 with instructions, father had no other questions about care/discharge, patient preferred to use wheelchair and was accompanied by father.

## 2022-03-23 NOTE — PLAN OF CARE
Goal Outcome Evaluation: Pt doing great this morning. Up walking and denies pain. No oxygen needed since awaking, doing IS when encouraged. U/S and CXR completed. Eating well once tests competed and able to eat. Dad at bedside. Plan to discharge once orders written                        LUCI (acute kidney injury) COVID-19

## 2022-03-23 NOTE — PLAN OF CARE
"PRIMARY DIAGNOSIS: \"GENERIC\" NURSING  OUTPATIENT/OBSERVATION GOALS TO BE MET BEFORE DISCHARGE:  1. No bleeding, hematoma or neurovascular compromise 4 hours post procedure and after bedrest is completed. - MET   2. Pain is controlled and tolerating oral intake. - MET   3. Patient is voiding. - MET   4. Stable vital signs. - MET   5. Post procedure tests are completed and results acceptable - NOT MET Needs Abdominal ultrasound and Echo before discharge       Discharge Planner Nurse   Safe discharge environment identified: Yes  Barriers to discharge: Yes (Needs abdominal ultrasound and Echo)       Entered by: Magali Gibson 3/22/22 5922     Please review provider order for any additional goals.   Nurse to notify provider when observation goals have been met and patient is ready for discharge.Goal Outcome Evaluation:                      "

## 2022-03-23 NOTE — PLAN OF CARE
"PRIMARY DIAGNOSIS: \"GENERIC\" NURSING    OUTPATIENT/OBSERVATION GOALS TO BE MET BEFORE DISCHARGE:    1. No bleeding, hematoma or neurovascular compromise 4 hours post procedure and after bedrest is completed. - MET   2. Pain is controlled and tolerating oral intake. - MET   3. Patient is voiding. - MET   4. Stable vital signs. - MET   5. Post procedure tests are completed and results acceptable - NOT MET Needs Abdominal ultrasound and Echo before discharge         Discharge Planner Nurse   Safe discharge environment identified: Yes  Barriers to discharge: Yes (Needs abdominal ultrasound and Echo)       Entered by: Magali Gibson 3/23/22 0631    Please review provider order for any additional goals.   Nurse to notify provider when observation goals have been met and patient is ready for discharge.Goal Outcome Evaluation:        "

## 2022-03-23 NOTE — PLAN OF CARE
"PRIMARY DIAGNOSIS: \"GENERIC\" NURSING  OUTPATIENT/OBSERVATION GOALS TO BE MET BEFORE DISCHARGE:  ADLs back to baseline: Yes    Activity and level of assistance: Ambulating independently.    Pain status: Pain free.    Return to near baseline physical activity: Yes     Discharge Planner Nurse   Safe discharge environment identified: Yes  Barriers to discharge: No       Entered by: CATHIE PIMENTEL 03/23/2022 10:35 AM    All obs goals met, pt to discharge when orders signed           Please review provider order for any additional goals.   Nurse to notify provider when observation goals have been met and patient is ready for discharge.Goal Outcome Evaluation:                      "

## 2022-03-23 NOTE — PROGRESS NOTES
Discharge goals are met.    ~No bleeding, hematoma or neurovascular compromise 4 hours post procedure and after bedrest is completed.   ~Pain is controlled and tolerating oral intake.   ~Patient is voiding.   ~Stable vital signs.   ~Post procedure tests are completed and results acceptable (if applicable    Patient discharged to home at 1145 accompanied by father.

## 2022-03-23 NOTE — DISCHARGE SUMMARY
"                               [facility]  Cardiac Catheterization & Electrophysiology Laboratory  Discharge Summary    Carlin Stern MRN# [id number]   YOB: 1995 Age: 26 year old     Date of Admission:  3/22/2022  Date of Discharge:  3/23/2022  Physician:   Rashaun Bryan MD  Primary Care Provider: Basilio Rahman           Diagnoses:     Hypoplastic left heart syndrome, s/p non-fenestrated Fontan           Procedures, Findings, Outcomes, Recommendations, Plans:     Heart catheterization, angiography, stenting of Fontan conduit     Mild hemoptysis (bleeding from ET tube) during procedure. Managed with supportive cares by anesthesia. 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 rate 22, height 1.657 m (5' 5.25\"), weight 50.9 kg (112 lb 3.4 oz), SpO2 94 %.         Follow-Up Appointments:   Primary Care Provider: 1 week(s)  Primary Cardiologist:  1 month(s)  Rashaun Bryan MD: as needed         Wound Care, Monitoring, and Other Instructions:     Watch the right groin site closely for any bleeding, swelling, redness, discharge, or change in color/temperature/sensation of the right leg     Call immediately if there is bleeding or fever    Keep the site clean and dry    May leave the site uncovered; if covering it with a band-aid be sure to change the band-aid any time it gets wet or dirty    Avoid vigorous activity for 72 hours to reduce the risk of bleeding from the site    Do not soak the site (bathe or " swim) until skin sites are healed; okay to shower or sponge-bathe after 24 hours    If any questions about the site, either your primary care provider or your cardiologist can examine it

## 2022-03-23 NOTE — PLAN OF CARE
"PRIMARY DIAGNOSIS: \"GENERIC\" NURSING    OUTPATIENT/OBSERVATION GOALS TO BE MET BEFORE DISCHARGE:  1. No bleeding, hematoma or neurovascular compromise 4 hours post procedure and after bedrest is completed. - MET   2. Pain is controlled and tolerating oral intake. - MET   3. Patient is voiding. - MET   4. Stable vital signs. - MET   5. Post procedure tests are completed and results acceptable - NOT MET (Needs Abdominal ultrasound and Echo before discharge)        Discharge Planner Nurse   Safe discharge environment identified: Yes  Barriers to discharge: Yes (Needs abdominal ultrasound and Echo)       Entered by: Magali Gibson 3/23/22 0300    Please review provider order for any additional goals.   Nurse to notify provider when observation goals have been met and patient is ready for discharge.Goal Outcome Evaluation:                          "

## 2022-03-23 NOTE — PROVIDER NOTIFICATION
03/22/22 2230 03/22/22 2238   Oxygen Therapy   SpO2 (!) 87 % 95 %   Device (Oxygen Therapy) none (room air) nasal cannula   Oxygen Delivery  --  2 LPM     Edy Ann MD notified of O2 desaturations to 87%. Per MD keep O2 sats above 90%, okay to use nasal cannula. Will try to wean as able overnight. Continue to monitor.

## 2022-03-28 ENCOUNTER — OFFICE VISIT (OUTPATIENT)
Dept: PULMONOLOGY | Facility: CLINIC | Age: 27
End: 2022-03-28
Attending: INTERNAL MEDICINE
Payer: MEDICARE

## 2022-03-28 ENCOUNTER — ANCILLARY PROCEDURE (OUTPATIENT)
Dept: GENERAL RADIOLOGY | Facility: CLINIC | Age: 27
End: 2022-03-28
Payer: MEDICARE

## 2022-03-28 VITALS
OXYGEN SATURATION: 94 % | DIASTOLIC BLOOD PRESSURE: 66 MMHG | HEART RATE: 76 BPM | SYSTOLIC BLOOD PRESSURE: 103 MMHG | WEIGHT: 113 LBS | HEIGHT: 65 IN | RESPIRATION RATE: 17 BRPM | BODY MASS INDEX: 18.83 KG/M2

## 2022-03-28 DIAGNOSIS — J98.4 LUNG DISEASE, RESTRICTIVE: ICD-10-CM

## 2022-03-28 DIAGNOSIS — Q23.4 HLHS (HYPOPLASTIC LEFT HEART SYNDROME): Primary | ICD-10-CM

## 2022-03-28 PROCEDURE — G0463 HOSPITAL OUTPT CLINIC VISIT: HCPCS | Mod: 25

## 2022-03-28 PROCEDURE — 94729 DIFFUSING CAPACITY: CPT | Performed by: INTERNAL MEDICINE

## 2022-03-28 PROCEDURE — 94375 RESPIRATORY FLOW VOLUME LOOP: CPT | Performed by: INTERNAL MEDICINE

## 2022-03-28 PROCEDURE — 99203 OFFICE O/P NEW LOW 30 MIN: CPT | Mod: 25

## 2022-03-28 PROCEDURE — 71046 X-RAY EXAM CHEST 2 VIEWS: CPT | Mod: GC | Performed by: RADIOLOGY

## 2022-03-28 ASSESSMENT — PAIN SCALES - GENERAL: PAINLEVEL: NO PAIN (0)

## 2022-03-28 NOTE — NURSING NOTE
Chief Complaint   Patient presents with     Consult     New single common ventricle and restriction    Medications reviewed and vital signs taken.   Angel Read CMA

## 2022-03-28 NOTE — PROGRESS NOTES
[facility]  Pulmonary Medicine  Visit Clinic Note  March 28, 2022         ASSESSMENT & PLAN       Hypoplastic left heart syndrome  Procedural related hemoptysis  Scoliosis    At this point, he has no known lung disease.  He denies any respiratory symptoms.  The hemoptysis was likely related to coughing in the setting of a recruitment maneuver on the ventilator.  His chest x-ray shows improving right upper lobe opacity.  No further follow-up on this is necessary.    Any pulmonary vascular disease that would develop later on in life related to his congenital heart disease would be managed by cardiology.  Right now, there is not a clear pulmonary diagnosis.  I will keep follow-up open-ended for him.  If there is any respiratory symptoms that happen in the future, I let him and his family know that they are more than welcome to get in touch with me.  I do not have any further testing or medication recommendations at this time.    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  The reason for consultation is not entirely clear to me based on the referral order from October 21, 2021, or from my chart review.  The family is not entirely clear the purpose of the visit either.  We spent the time talking about his recent procedure and any respiratory symptoms.    1 week ago, he had his conduit stented.  He was intubated for the procedure.  A recurrent maneuver was performed while intubated, and he coughed during that.  Shortly afterwards, blood was noted in the ET tube.  There is a right upper lobe opacification as well.  The bleeding subsided, and he has not had any further hemoptysis since being discharged.  He does have an occasional cough which has been improving.      Outside of this acute event, he denies any regular shortness of breath or cough.  He has never been diagnosed with asthma.  He has never been given an inhaler.             Past Medical and Surgical History:     Past Medical History:   Diagnosis Date     Congenital heart disease     HLHS     Past Surgical History:   Procedure Laterality Date     CARDIAC SURGERY      Meagan, Jamie, Fontan     HEART CATH CHILD N/A 3/11/2016    Procedure: HEART CATH CHILD;  Surgeon: Rashaun Marques MD;  Location:  OR           Family History:     Family History   Family history unknown: Yes              Social History:     Social History     Socioeconomic History     Marital status: Single     Spouse name: Not on file     Number of children: Not on file     Years of education: Not on file     Highest education level: Not on file   Occupational History     Not on file   Tobacco Use     Smoking status: Never Smoker     Smokeless tobacco: Never Used   Substance and Sexual Activity     Alcohol use: Not on file     Drug use: Not on file     Sexual activity: Not on file   Other Topics Concern     Parent/sibling w/ CABG, MI or angioplasty before 65F 55M? Not Asked   Social History Narrative     Not on file     Social Determinants of Health     Financial Resource Strain: Not on file   Food Insecurity: Not on file   Transportation Needs: Not on file   Physical Activity: Not on file   Stress: Not on file   Social Connections: Not on file   Intimate Partner Violence: Not on file   Housing Stability: Not on file            Medications:     Current Outpatient Medications   Medication     aspirin (ASA) 81 MG tablet     clopidogrel (PLAVIX) 75 MG tablet     digoxin (LANOXIN) 250 MCG tablet     lisinopril (ZESTRIL) 30 MG tablet     sertraline (ZOLOFT) 100 MG tablet     No current facility-administered medications for this visit.            Review of Systems:       A complete review of systems was otherwise negative except as  "noted in the HPI.      PHYSICAL EXAM:  /66   Pulse 76   Resp 17   Ht 1.657 m (5' 5.25\")   Wt 51.3 kg (113 lb)   SpO2 94%   BMI 18.66 kg/m       General: Comfortable, No apparent distress  Eyes: Anicteric  Ears: Hearing grossly normal  Mouth: Oral mucosa is moist, without any lesions. No oropharyngeal exudate.  Neck: supple, no thyromegaly  Lymphatics: No cervical or supraclavicular nodes  Respiratory: Good air movement. No crackles. No rhonchi. No wheezes  Cardiac: RRR, loud S2.  No murmurs heard.  Musculoskeletal: Extremities normal. No clubbing. No cyanosis. No edema.  Skin: No rash on limited exam  Neuro: Normal mentation. Normal speech.  Psych:Normal affect           Data:   All laboratory and imaging data reviewed.      PFT:   Pulmonary function testing was performed but uninterpretable.  He only exhaled for 1 second.    CXR: I have reviewed the CXR images from today and agree with the radiologist interpretation below:     Midline trachea. Normal cardiomediastinal silhouette. Distinct  pulmonary vasculature. No significant pleural effusion. No discernible  pneumothorax. Near resolution of right upper lobe hazy opacification.  No new focal airspace opacities. Surgical clips project over the left  upper quadrant. Mild thoracic dextroscoliosis and moderate  thoracolumbar levoscoliosis. Unremarkable soft tissues.                                                                      IMPRESSION:   1.  Nearly resolved right upper lobe opacification.  2.  Stable stent positioning.    Chest CT: I have reviewed the chest CT images from 2019.  Lung parenchyma appears normal      Recent Results (from the past 168 hour(s))   Glucose by meter    Collection Time: 03/22/22  7:15 AM   Result Value Ref Range    GLUCOSE BY METER POCT 88 70 - 99 mg/dL   CBC with platelets    Collection Time: 03/22/22  8:04 AM   Result Value Ref Range    WBC Count 4.3 4.0 - 11.0 10e3/uL    RBC Count 5.20 4.40 - 5.90 10e6/uL    Hemoglobin " 15.7 13.3 - 17.7 g/dL    Hematocrit 43.9 40.0 - 53.0 %    MCV 84 78 - 100 fL    MCH 30.2 26.5 - 33.0 pg    MCHC 35.8 31.5 - 36.5 g/dL    RDW 12.4 10.0 - 15.0 %    Platelet Count 73 (L) 150 - 450 10e3/uL   AFP tumor marker    Collection Time: 03/22/22  8:05 AM   Result Value Ref Range    AFP tumor marker 3.2 0.0 - 8.0 ug/L   Basic metabolic panel    Collection Time: 03/22/22  8:05 AM   Result Value Ref Range    Sodium 138 133 - 144 mmol/L    Potassium 4.4 3.4 - 5.3 mmol/L    Chloride 105 94 - 109 mmol/L    Carbon Dioxide (CO2) 25 20 - 32 mmol/L    Anion Gap 8 3 - 14 mmol/L    Urea Nitrogen 31 (H) 7 - 30 mg/dL    Creatinine 1.14 0.66 - 1.25 mg/dL    Calcium 9.5 8.5 - 10.1 mg/dL    Glucose 94 70 - 99 mg/dL    GFR Estimate >90 >60 mL/min/1.73m2   Hepatic function panel    Collection Time: 03/22/22  8:05 AM   Result Value Ref Range    Bilirubin Total 0.7 0.2 - 1.3 mg/dL    Bilirubin Direct 0.1 0.0 - 0.2 mg/dL    Protein Total 7.8 6.8 - 8.8 g/dL    Albumin 4.6 3.4 - 5.0 g/dL    Alkaline Phosphatase 58 40 - 150 U/L    AST 26 0 - 45 U/L    ALT 32 0 - 70 U/L   INR    Collection Time: 03/22/22  8:05 AM   Result Value Ref Range    INR 1.14 0.86 - 1.14   Adult Type and Screen    Collection Time: 03/22/22  8:05 AM   Result Value Ref Range    ABO/RH(D) A POS     Antibody Screen Negative Negative    SPECIMEN EXPIRATION DATE [id number]    Troponin I    Collection Time: 03/22/22  8:05 AM   Result Value Ref Range    Troponin I High Sensitivity 8 <79 ng/L   Nt probnp inpatient    Collection Time: 03/22/22  8:05 AM   Result Value Ref Range    N terminal Pro BNP Inpatient 242 0 - 450 pg/mL   Prepare red blood cells (unit)    Collection Time: 03/22/22  9:29 AM   Result Value Ref Range    CROSSMATCH Compatible     UNIT ABO/RH A Pos     Unit Number [id number]     Unit Status Returned from Issue     Blood Component Type Red Blood Cells     Product Code [id number]     CODING SYSTEM [id number]     UNIT TYPE ISBT 6200     ISSUE DATE AND TIME  [id number]    Prepare red blood cells (unit)    Collection Time: 03/22/22  9:29 AM   Result Value Ref Range    CROSSMATCH Compatible     UNIT ABO/RH A Pos     Unit Number [id number]     Unit Status Returned from Issue     Blood Component Type Red Blood Cells     Product Code N7699P86     CODING SYSTEM [id number]     UNIT TYPE ISBT 6200     ISSUE DATE AND TIME [id number]    Arterial Panel POCT    Collection Time: 03/22/22  9:57 AM   Result Value Ref Range    pH Arterial POCT 7.44 7.35 - 7.45    pCO2 Arterial POCT 36 35 - 45 mm Hg    pO2 Arterial POCT 62 (L) 80 - 105 mm Hg    Bicarbonate Arterial POCT 25 21 - 28 mmol/L    Sodium POCT 137 133 - 144 mmol/L    Potassium POCT 4.6 3.5 - 5.0 mmol/L    Hemoglobin POCT 14.0 13.3 - 17.7 g/dL    Glucose Whole Blood POCT 90 70 - 99 mg/dL    Calcium, Ionized Whole Blood POCT 4.7 4.4 - 5.2 mg/dL    Base Excess/Deficit (+/-) POCT 1.1 -9.6 - 2.0 mmol/L    FIO2 POCT 21.0 %    Lactic Acid POCT 0.4 <=2.0 mmol/L   Activated clotting time celite, POCT    Collection Time: 03/22/22 10:10 AM   Result Value Ref Range    Activated Clotting Time (Celite) POCT 297 (H) 74 - 150 seconds   Activated clotting time celite, POCT    Collection Time: 03/22/22 10:38 AM   Result Value Ref Range    Activated Clotting Time (Celite) POCT 245 (H) 74 - 150 seconds   Arterial Panel POCT    Collection Time: 03/22/22 11:03 AM   Result Value Ref Range    pH Arterial POCT 7.39 7.35 - 7.45    pCO2 Arterial POCT 39 35 - 45 mm Hg    pO2 Arterial POCT 62 (L) 80 - 105 mm Hg    Bicarbonate Arterial POCT 24 21 - 28 mmol/L    Sodium POCT 136 133 - 144 mmol/L    Potassium POCT 4.5 3.5 - 5.0 mmol/L    Hemoglobin POCT 12.5 (L) 13.3 - 17.7 g/dL    Glucose Whole Blood POCT 89 70 - 99 mg/dL    Calcium, Ionized Whole Blood POCT 4.4 4.4 - 5.2 mg/dL    Base Excess/Deficit (+/-) POCT -1.2 -9.6 - 2.0 mmol/L    FIO2 POCT 100.0 %    Lactic Acid POCT 0.8 <=2.0 mmol/L   Oxyhemoglobin, arterial POCT    Collection Time: 03/22/22  11:03 AM   Result Value Ref Range    Oxyhemoglobin POCT 90 (L) 92 - 100 %   Activated clotting time celite, POCT    Collection Time: 03/22/22 11:04 AM   Result Value Ref Range    Activated Clotting Time (Celite) POCT 267 (H) 74 - 150 seconds   Arterial Panel POCT    Collection Time: 03/22/22 11:21 AM   Result Value Ref Range    pH Arterial POCT 7.35 7.35 - 7.45    pCO2 Arterial POCT 45 35 - 45 mm Hg    pO2 Arterial POCT 121 (H) 80 - 105 mm Hg    Bicarbonate Arterial POCT 25 21 - 28 mmol/L    Sodium POCT 136 133 - 144 mmol/L    Potassium POCT 4.8 3.5 - 5.0 mmol/L    Hemoglobin POCT 13.4 13.3 - 17.7 g/dL    Glucose Whole Blood POCT 102 (H) 70 - 99 mg/dL    Calcium, Ionized Whole Blood POCT 4.6 4.4 - 5.2 mg/dL    Base Excess/Deficit (+/-) POCT -1.2 -9.6 - 2.0 mmol/L    FIO2 POCT 100.0 %    Lactic Acid POCT 0.6 <=2.0 mmol/L   Oxyhemoglobin, arterial POCT    Collection Time: 03/22/22 11:21 AM   Result Value Ref Range    Oxyhemoglobin POCT 97 92 - 100 %   Activated clotting time celite, POCT    Collection Time: 03/22/22 11:31 AM   Result Value Ref Range    Activated Clotting Time (Celite) POCT 245 (H) 74 - 150 seconds   Arterial Panel POCT    Collection Time: 03/22/22 11:58 AM   Result Value Ref Range    pH Arterial POCT 7.37 7.35 - 7.45    pCO2 Arterial POCT 43 35 - 45 mm Hg    pO2 Arterial POCT 250 (H) 80 - 105 mm Hg    Bicarbonate Arterial POCT 25 21 - 28 mmol/L    Sodium POCT 135 133 - 144 mmol/L    Potassium POCT 5.4 (H) 3.5 - 5.0 mmol/L    Hemoglobin POCT 14.0 13.3 - 17.7 g/dL    Glucose Whole Blood POCT 111 (H) 70 - 99 mg/dL    Calcium, Ionized Whole Blood POCT 4.6 4.4 - 5.2 mg/dL    Base Excess/Deficit (+/-) POCT -0.5 -9.6 - 2.0 mmol/L    FIO2 POCT 100.0 %    Lactic Acid POCT 0.5 <=2.0 mmol/L   Oxyhemoglobin, arterial POCT    Collection Time: 03/22/22 11:58 AM   Result Value Ref Range    Oxyhemoglobin POCT 98 92 - 100 %   Activated clotting time celite, POCT    Collection Time: 03/22/22 11:59 AM   Result Value Ref  Range    Activated Clotting Time (Celite) POCT 241 (H) 74 - 150 seconds   Arterial Panel POCT    Collection Time: 03/22/22 12:25 PM   Result Value Ref Range    pH Arterial POCT 7.36 7.35 - 7.45    pCO2 Arterial POCT 42 35 - 45 mm Hg    pO2 Arterial POCT 72 (L) 80 - 105 mm Hg    Bicarbonate Arterial POCT 24 21 - 28 mmol/L    Sodium POCT 135 133 - 144 mmol/L    Potassium POCT 5.4 (H) 3.5 - 5.0 mmol/L    Hemoglobin POCT 12.8 (L) 13.3 - 17.7 g/dL    Glucose Whole Blood POCT 108 (H) 70 - 99 mg/dL    Calcium, Ionized Whole Blood POCT 4.4 4.4 - 5.2 mg/dL    Base Excess/Deficit (+/-) POCT -1.7 -9.6 - 2.0 mmol/L    FIO2 POCT 21.0 %    Lactic Acid POCT 0.5 <=2.0 mmol/L   Activated clotting time celite, POCT    Collection Time: 03/22/22 12:26 PM   Result Value Ref Range    Activated Clotting Time (Celite) POCT 220 (H) 74 - 150 seconds   Pulmonary Function Test    Collection Time: 03/28/22  2:01 PM   Result Value Ref Range    FVC-Pred 4.81 L    FVC-Pre 1.32 L    FVC-%Pred-Pre 27 %    FEV1-Pre 1.32 L    FEV1-%Pred-Pre 32 %    FEV1FVC-Pred 85 %    FEV1FVC-Pre 100 %    FEFMax-Pred 9.30 L/sec    FEFMax-Pre 4.16 L/sec    FEFMax-%Pred-Pre 44 %    FEF2575-Pred 4.44 L/sec    FEF2575-Pre 2.65 L/sec    WXE6686-%Pred-Pre 59 %    ExpTime-Pre 0.68 sec    FIFMax-Pre 2.90 L/sec    VC-Pred 5.12 L    VC-Pre 1.05 L    VC-%Pred-Pre 20 %    IC-Pred 3.02 L    IC-Pre 0.85 L    IC-%Pred-Pre 28 %    ERV-Pred 2.10 L    ERV-Pre 0.20 L    ERV-%Pred-Pre 9 %    FEV1FEV6-Pred 84 %    FEV1FEV6-Pre 100 %    DLCOunc-Pred 29.11 ml/min/mmHg    DLCOunc-Pre 15.87 ml/min/mmHg    DLCOunc-%Pred-Pre 54 %    VA-Pre 6.91 L    VA-%Pred-Pre 122 %    FEV1SVC-Pred 80 %    FEV1SVC-Pre 126 %

## 2022-03-29 LAB
DLCOUNC-%PRED-PRE: 54 %
DLCOUNC-PRE: 15.87 ML/MIN/MMHG
DLCOUNC-PRED: 29.11 ML/MIN/MMHG
ERV-%PRED-PRE: 9 %
ERV-PRE: 0.2 L
ERV-PRED: 2.1 L
EXPTIME-PRE: 0.68 SEC
FEF2575-%PRED-PRE: 59 %
FEF2575-PRE: 2.65 L/SEC
FEF2575-PRED: 4.44 L/SEC
FEFMAX-%PRED-PRE: 44 %
FEFMAX-PRE: 4.16 L/SEC
FEFMAX-PRED: 9.3 L/SEC
FEV1-%PRED-PRE: 32 %
FEV1-PRE: 1.32 L
FEV1FEV6-PRE: 100 %
FEV1FEV6-PRED: 84 %
FEV1FVC-PRE: 100 %
FEV1FVC-PRED: 85 %
FEV1SVC-PRE: 126 %
FEV1SVC-PRED: 80 %
FIFMAX-PRE: 2.9 L/SEC
FVC-%PRED-PRE: 27 %
FVC-PRE: 1.32 L
FVC-PRED: 4.81 L
IC-%PRED-PRE: 28 %
IC-PRE: 0.85 L
IC-PRED: 3.02 L
VA-%PRED-PRE: 122 %
VA-PRE: 6.91 L
VC-%PRED-PRE: 20 %
VC-PRE: 1.05 L
VC-PRED: 5.12 L

## 2022-04-18 ENCOUNTER — OFFICE VISIT (OUTPATIENT)
Dept: PEDIATRIC CARDIOLOGY | Facility: CLINIC | Age: 27
End: 2022-04-18
Payer: MEDICARE

## 2022-04-18 ENCOUNTER — ANCILLARY PROCEDURE (OUTPATIENT)
Dept: CARDIOLOGY | Facility: CLINIC | Age: 27
End: 2022-04-18
Payer: MEDICARE

## 2022-04-18 VITALS
HEART RATE: 75 BPM | DIASTOLIC BLOOD PRESSURE: 48 MMHG | OXYGEN SATURATION: 94 % | HEIGHT: 65 IN | SYSTOLIC BLOOD PRESSURE: 93 MMHG | BODY MASS INDEX: 18.62 KG/M2 | WEIGHT: 111.77 LBS

## 2022-04-18 DIAGNOSIS — Q23.4 HLHS (HYPOPLASTIC LEFT HEART SYNDROME): Primary | ICD-10-CM

## 2022-04-18 DIAGNOSIS — Q20.4 SINGLE COMMON VENTRICLE: ICD-10-CM

## 2022-04-18 DIAGNOSIS — Z98.890 S/P FONTAN PROCEDURE: ICD-10-CM

## 2022-04-18 DIAGNOSIS — Q23.4 HLHS (HYPOPLASTIC LEFT HEART SYNDROME): ICD-10-CM

## 2022-04-18 LAB
ATRIAL RATE - MUSE: 71 BPM
DIASTOLIC BLOOD PRESSURE - MUSE: NORMAL MMHG
INTERPRETATION ECG - MUSE: NORMAL
P AXIS - MUSE: 6 DEGREES
PR INTERVAL - MUSE: 142 MS
QRS DURATION - MUSE: 114 MS
QT - MUSE: 362 MS
QTC - MUSE: 393 MS
R AXIS - MUSE: 125 DEGREES
SYSTOLIC BLOOD PRESSURE - MUSE: NORMAL MMHG
T AXIS - MUSE: 16 DEGREES
VENTRICULAR RATE- MUSE: 71 BPM

## 2022-04-18 PROCEDURE — 93320 DOPPLER ECHO COMPLETE: CPT | Performed by: PEDIATRICS

## 2022-04-18 PROCEDURE — 93325 DOPPLER ECHO COLOR FLOW MAPG: CPT | Performed by: PEDIATRICS

## 2022-04-18 PROCEDURE — 99214 OFFICE O/P EST MOD 30 MIN: CPT | Mod: 25 | Performed by: PEDIATRICS

## 2022-04-18 PROCEDURE — 93303 ECHO TRANSTHORACIC: CPT | Performed by: PEDIATRICS

## 2022-04-18 ASSESSMENT — PAIN SCALES - GENERAL: PAINLEVEL: NO PAIN (0)

## 2022-04-18 NOTE — LETTER
4/18/2022      RE: Carlin Stern  [address]  [facility] [address]       [facility] Note    Carlin is a 26 year old male with     IMP: Carlin Stern is a 26 year old male with history of HLHS, s/p Non-Fenestrated Fontan procedure with developmental delay.     Recently underwent Cardiac cath- March 2022-Narrowing of the proximal Fontan conduit, which was successful stented using a 12 x 36 mm EV3 Max LD stent on a 20 mm BIB and post dilated with a 22 mm Hammond balloon  ? Normal transpulmonary gradient of 4 mmHg, normal PVR of 1.77 indexed Woods units  ? Low-normal cardiac index of 2.26 L/min/m2  ? There was a 4 mmHg gradient from ascending to descending thoracic aorta    Doing well after the cath. His O 2 sat- 94% on RA. No major concerns.    PLAN:    F/U in 1 year with Echo, EKG and O 2 sat  Discussed in regards to transition to adult congenital program at [facility]  No Activity Restrictions  Adherence to heart healthy diet, regular exercise habits, avoidance of tobacco products and maintenance of a healthy weight  Will adjust medications    Aspirin 81 mg 4 times/week                              Plavix 75 mg daily     Digoxin 250 mcg BID    Lisinopril 30 mg once daily  - SBE Prophylaxis needed for dental procedures  - Results were reviewed with the family.  - Follow-up with Pulmonologist and GI specialist - single ventricle physiology      Patient Active Problem List   Diagnosis     Neuromuscular scoliosis of thoracolumbar region     HLHS (hypoplastic left heart syndrome)       Patient Active Problem List    Diagnosis     Neuromuscular scoliosis of thoracolumbar region             Attending Attestation:     Echocardiographic images were reviewed by me.    History of Present Illness:   I was asked to see this patient by Primary Care Provider Basilio Rahman to consult regarding single ventricle.  s/p Meagan, and s/p Non-fenestrated Fontan procedure.    Carlin was last seen in 2020. He has undergone a [place], Jamie and Fontan procedures at [facility] in [place]. Carlin was adopted at the age of [age] and since the last follow-up visit, according to the parents, he has done well.     He  underwent MRI on 4/8/15 which showed mild narrowing of the proximal LPA segment with pulmonary distribution of 45% to rt. Lung and 55% to left lung. With dilated RV - EDV 80ml/m2 with moderate hypertrophy and EF of 38%.  Last treadmill test performed on 2/13/13: Normal ventilatory effort, Peak Vo2 low 61% of predicted. Low peak heart rate -indicating chronotropic incompetence. Reduced cardiac capacity.    He is developmentally delayed. His hearing is good. His left eye vision is abnormal. His medications include Digoxin 125 micrograms twice daily, aspirin 81 mg and Lisinopril 20 mg once daily. Plavix daily.    He has been active, stays at home and helps at home,walks outside.He has no shortness of breath, chest pain, syncope or pre-syncope. No other major illnesses or concerns. His appetite is normal, sleeps well.    Cardiac cath: Jan 2016, RV pressure 83/9, RV sat 95%, Fontan pressure 11 mm Hg, normal PVR and cardiac output. Fontan O 2 sat 73%. Normal flows across the branch pulmonary arteries.    Lab: Dec 2015, Hb- 13.9, pro BNP- 241, Troponin- less than 0.015, S.Cret 1.    Lab- 2019- Hb 15.1, BUN-25, cret- 1.06, pro     Lab- 2022-  N Terminal Pro BNP- 242, Troponin- less than 79 ng/L, LFT normal. Hb 15.7, low Plt count    CT chest- 2019- Hypoplastic left heart status post [place] procedure.Small aortopulmonary collaterals, but no major collateralization is appreciated.Patency of the Jamie, but with predominant uni-directional flow tothe left pulmonary artery.Punctate right lower lobe nodule, likely postinfectious.  Scoliosis.      Cath- 2022- Low filling pressures and systemic blood pressures (systolics in the 70s) on initial hemodynamics, mean SVC  pressure of 8 mmHg and Fontan pressure of 12 mmHg    Patient given 700 ml bolus of normal saline, with improvement - mean Fontan pressure of 14 mmHg with 1 mmHg gradient into both the RPA and LPA. No gradient into SVC, IVC, hepatic vein. Normal transhepatic gradient of 1 mmHg   ? Normal transpulmonary gradient of 4 mmHg, normal PVR of 1.77 indexed Woods units  ? Low-normal cardiac index of 2.26 L/min/m2  ? There was a 4 mmHg gradient from ascending to descending thoracic aorta, no intervention performed    No evidence of significant AP collaterals    Single V-V collateral from left innominate vein to left pulmonary vein, left as pop-off    Narrowing of the proximal Fontan conduit, which was successful stented using a 12 x 36 mm EV3 Max LD stent on a 20 mm BIB and post dilated with a 22 mm Bruce balloon              Abdominal U/S- IMPRESSION:  March 2022  1. Mildly coarsened hepatic echotexture may represent mild intrinsic  hepatic parenchymal disease.  2. Patent and antegrade hepatic Doppler evaluation.    PFT- Narrative    Although the FEV1 and FVC are reduced, the FEV1/FVC ratio is increased.  The inspiratory and expiratory flow rates are reduced.  The diffusing capacity is reduced.  However, the diffusing capacity was not corrected for the patient's hemoglobin.        I have reviewed past medical family and social history with the patient or family.    Past Medical History:   No Recent Hospitalizations  Hypoplastic left heart syndrome  S/p [place], Jamie, Non-fenestrated Fontan procedure  Developmental delay  S/P stent- Fontan     Family and Social History:   No history of congenital heart disease  Non-contributory         Review of Systems:   A comprehensive Review of Systems was performed is negative other than noted in the HPI  CV and Pulm ROS  are neg  No RIOS, sob, cyanosis, edema, cough, wheeze, syncope, chest pain, palpitations          Medications:   I have reviewed this patient's current  "medications    Current Outpatient Medications   Medication     aspirin (ASA) 81 MG tablet     clopidogrel (PLAVIX) 75 MG tablet     digoxin (LANOXIN) 250 MCG tablet     lisinopril (ZESTRIL) 30 MG tablet     sertraline (ZOLOFT) 100 MG tablet     No current facility-administered medications for this visit.         Physical Exam:     Blood pressure 93/48, pulse 75, height 1.651 m (5' 5\"), weight 50.7 kg (111 lb 12.4 oz), SpO2 94 %.    General - NAD, awake, alert   HEENT - NC/AT EOMI   Cardiac - RRR nl S1 and S2 heard, systolic murmur grade 1/6 LSB. Diastolic murmur grade 2/4 No click, thrill or heave   Respiratory - Lungs clear, no rales   Abdominal - Liver at RCM   Extremity  Nl pulses in brachial and femoral areas, No Clubbing, Edema, Cyanosis   Skin - No rash   Neuro - Nl gait, posture, tone         Labs     EKG results:      EKG with today's visit V-rate of 71/min, sinus, RBBB.  msec.      Echocardiography today:  Hypoplastic left heart syndrome. Patient has undergone Me[place], Jamie and Fontan operations. Large muscular ventricular septal defect with bidirectional (systolic right to left) flow. 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 Post stenting of the Fontan pathway (3/22/22).There is unobstructed flow in the left pulmonary artery. Qualitatively mild/moderately depressed right ventricular systolic function. Normal appearance and motion of the systemic tricuspid valve, with moderate central regurgitation. Low-velocity unobstructed phasic flow in the superior vena cava, inferior vena cava, and proximal/distal Fontan conduit. Unobstructed neoaortic outflow with mild insufficiency. Aortic arch is unobstructed. There is normal pulsatile flow in the descending abdominal aorta. No effusion.     Patient Education: During this visit I discussed in detail the patient s symptoms, physical exam and evaluation results findings, tentative diagnosis as well as the treatment " plan (Including but not limited to possible side effects and complications related to the disease, treatment modalities and intervention(s). Family expressed understanding and consent. Family was receptive and ready to learn; no apparent learning barriers were identified.    Sincerely,    Nicci Rene MD, [facility]  Pediatric Cardiologist      Copy to patient  Carlin Stern  [address]  [facility] [address]

## 2022-04-18 NOTE — NURSING NOTE
"Chief Complaint   Patient presents with     New Patient     RECHECK       Patient being seen for HLHS follow-up       BP 93/48 (BP Location: Right arm, Patient Position: Sitting, Cuff Size: Adult Regular)   Pulse 75   Ht 1.651 m (5' 5\")   Wt 50.7 kg (111 lb 12.4 oz)   BMI 18.60 kg/m      I have Reviewed the patients medications and allergies      Arcadio Sheppard LPN  April 18, 2022    "

## 2022-04-18 NOTE — PATIENT INSTRUCTIONS
[facility]  [facility] [place]      Pediatric Call Center Scheduling and Nurse Questions:  [phone]  Kerline Tovar, RN Care Coordinator    After hours urgent matters that cannot wait until the next business day:  [phone].  Ask for the on-call pediatric doctor for the specialty you are calling for be paged.    For dermatology urgent matters that cannot wait until the next business day, is over a holiday and/or a weekend please call [phone] and ask for the Dermatology Resident On-Call to be paged.    Prescription Renewals:  Please call your pharmacy first.  Your pharmacy must fax requests to [phone].  Please allow 2-3 days for prescriptions to be authorized.    If your physician has ordered a CT or MRI, you may schedule this test by calling [facility] in [place] at [phone].    **If your child is having a sedated procedure, they will need a history and physical done at their Primary Care Provider within 30 days of the procedure.  If your child was seen by the ordering provider in our office within 30 days of the procedure, their visit summary will work for the H&P unless they inform you otherwise.  If you have any questions, please call the RN Care Coordinator.**    **If your child is going to be admitted to [facility] for testing or a procedure, they will need a PCR COVID test within 4 days of admission.  [facility] should be contacting you to schedule.  If you do not hear from them, you can call [phone] to schedule**

## 2022-04-18 NOTE — PROGRESS NOTES
[facility] Note    Carlin is a 26 year old male with     IMP: Carlin Stern is a 26 year old male with history of HLHS, s/p Non-Fenestrated Fontan procedure with developmental delay.     Recently underwent Cardiac cath- March 2022-Narrowing of the proximal Fontan conduit, which was successful stented using a 12 x 36 mm EV3 Max LD stent on a 20 mm BIB and post dilated with a 22 mm [place] balloon  ? Normal transpulmonary gradient of 4 mmHg, normal PVR of 1.77 indexed Woods units  ? Low-normal cardiac index of 2.26 L/min/m2  ? There was a 4 mmHg gradient from ascending to descending thoracic aorta    Doing well after the cath. His O 2 sat- 94% on RA. No major concerns.    PLAN:    F/U in 1 year with Echo, EKG and O 2 sat  Discussed in regards to transition to adult congenital program at [facility]  No Activity Restrictions  Adherence to heart healthy diet, regular exercise habits, avoidance of tobacco products and maintenance of a healthy weight  Will adjust medications    Aspirin 81 mg 4 times/week                              Plavix 75 mg daily     Digoxin 250 mcg BID    Lisinopril 30 mg once daily  - SBE Prophylaxis needed for dental procedures  - Results were reviewed with the family.  - Follow-up with Pulmonologist and GI specialist - single ventricle physiology      Patient Active Problem List   Diagnosis     Neuromuscular scoliosis of thoracolumbar region     HLHS (hypoplastic left heart syndrome)       Patient Active Problem List    Diagnosis     Neuromuscular scoliosis of thoracolumbar region             Attending Attestation:     Echocardiographic images were reviewed by me.    History of Present Illness:   I was asked to see this patient by Primary Care Provider Basilio Rahman to consult regarding single ventricle.  s/p Meagan, and s/p Non-fenestrated Fontan procedure.   Carlin was last seen in 2020. He has undergone a [place], Jamie and Fontan  procedures at [facility] in [place]. Carlin was adopted at the age of [age] and since the last follow-up visit, according to the parents, he has done well.     He  underwent MRI on 4/8/15 which showed mild narrowing of the proximal LPA segment with pulmonary distribution of 45% to rt. Lung and 55% to left lung. With dilated RV - EDV 80ml/m2 with moderate hypertrophy and EF of 38%.  Last treadmill test performed on 2/13/13: Normal ventilatory effort, Peak Vo2 low 61% of predicted. Low peak heart rate -indicating chronotropic incompetence. Reduced cardiac capacity.    He is developmentally delayed. His hearing is good. His left eye vision is abnormal. His medications include Digoxin 125 micrograms twice daily, aspirin 81 mg and Lisinopril 20 mg once daily. Plavix daily.    He has been active, stays at home and helps at home,walks outside.He has no shortness of breath, chest pain, syncope or pre-syncope. No other major illnesses or concerns. His appetite is normal, sleeps well.    Cardiac cath: Jan 2016, RV pressure 83/9, RV sat 95%, Fontan pressure 11 mm Hg, normal PVR and cardiac output. Fontan O 2 sat 73%. Normal flows across the branch pulmonary arteries.    Lab: Dec 2015, Hb- 13.9, pro BNP- 241, Troponin- less than 0.015, S.Cret 1.    Lab- 2019- Hb 15.1, BUN-25, cret- 1.06, pro     Lab- 2022-  N Terminal Pro BNP- 242, Troponin- less than 79 ng/L, LFT normal. Hb 15.7, low Plt count    CT chest- 2019- Hypoplastic left heart status post Meagan procedure.Small aortopulmonary collaterals, but no major collateralization is appreciated.Patency of the Jamie, but with predominant uni-directional flow tothe left pulmonary artery.Punctate right lower lobe nodule, likely postinfectious.  Scoliosis.      Cath- 2022- Low filling pressures and systemic blood pressures (systolics in the 70s) on initial hemodynamics, mean SVC pressure of 8 mmHg and Fontan pressure of 12 mmHg    Patient given 700 ml  bolus of normal saline, with improvement - mean Fontan pressure of 14 mmHg with 1 mmHg gradient into both the RPA and LPA. No gradient into SVC, IVC, hepatic vein. Normal transhepatic gradient of 1 mmHg   ? Normal transpulmonary gradient of 4 mmHg, normal PVR of 1.77 indexed Woods units  ? Low-normal cardiac index of 2.26 L/min/m2  ? There was a 4 mmHg gradient from ascending to descending thoracic aorta, no intervention performed    No evidence of significant AP collaterals    Single V-V collateral from left innominate vein to left pulmonary vein, left as pop-off    Narrowing of the proximal Fontan conduit, which was successful stented using a 12 x 36 mm EV3 Max LD stent on a 20 mm BIB and post dilated with a 22 mm Lisa balloon              Abdominal U/S- IMPRESSION:  March 2022  1. Mildly coarsened hepatic echotexture may represent mild intrinsic  hepatic parenchymal disease.  2. Patent and antegrade hepatic Doppler evaluation.    PFT- Narrative    Although the FEV1 and FVC are reduced, the FEV1/FVC ratio is increased.  The inspiratory and expiratory flow rates are reduced.  The diffusing capacity is reduced.  However, the diffusing capacity was not corrected for the patient's hemoglobin.        I have reviewed past medical family and social history with the patient or family.    Past Medical History:   No Recent Hospitalizations  Hypoplastic left heart syndrome  S/p [place], Jamie, Non-fenestrated Fontan procedure  Developmental delay  S/P stent- Fontan     Family and Social History:   No history of congenital heart disease  Non-contributory         Review of Systems:   A comprehensive Review of Systems was performed is negative other than noted in the HPI  CV and Pulm ROS  are neg  No RIOS, sob, cyanosis, edema, cough, wheeze, syncope, chest pain, palpitations          Medications:   I have reviewed this patient's current medications    Current Outpatient Medications   Medication     aspirin (ASA) 81 MG tablet  "    clopidogrel (PLAVIX) 75 MG tablet     digoxin (LANOXIN) 250 MCG tablet     lisinopril (ZESTRIL) 30 MG tablet     sertraline (ZOLOFT) 100 MG tablet     No current facility-administered medications for this visit.         Physical Exam:     Blood pressure 93/48, pulse 75, height 1.651 m (5' 5\"), weight 50.7 kg (111 lb 12.4 oz), SpO2 94 %.    General - NAD, awake, alert   HEENT - NC/AT EOMI   Cardiac - RRR nl S1 and S2 heard, systolic murmur grade 1/6 LSB. Diastolic murmur grade 2/4 No click, thrill or heave   Respiratory - Lungs clear, no rales   Abdominal - Liver at RCM   Extremity  Nl pulses in brachial and femoral areas, No Clubbing, Edema, Cyanosis   Skin - No rash   Neuro - Nl gait, posture, tone         Labs     EKG results:      EKG with today's visit V-rate of 71/min, sinus, RBBB.  msec.      Echocardiography today:  Hypoplastic left heart syndrome. Patient has undergone [place], Jamie and Fontan operations. Large muscular ventricular septal defect with bidirectional (systolic right to left) flow. 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 Post stenting of the Fontan pathway (3/22/22).There is unobstructed flow in the left pulmonary artery. Qualitatively mild/moderately depressed right ventricular systolic function. Normal appearance and motion of the systemic tricuspid valve, with moderate central regurgitation. Low-velocity unobstructed phasic flow in the superior vena cava, inferior vena cava, and proximal/distal Fontan conduit. Unobstructed neoaortic outflow with mild insufficiency. Aortic arch is unobstructed. There is normal pulsatile flow in the descending abdominal aorta. No effusion.     Patient Education: During this visit I discussed in detail the patient s symptoms, physical exam and evaluation results findings, tentative diagnosis as well as the treatment plan (Including but not limited to possible side effects and complications related to the " disease, treatment modalities and intervention(s). Family expressed understanding and consent. Family was receptive and ready to learn; no apparent learning barriers were identified.    Sincerely,    Nicci Rene MD, AMEE  Pediatric Cardiologist    CC:   Copy to patient  ROB MADRIGAL James  [id number]  [facility] [address]

## 2022-09-30 DIAGNOSIS — Q23.4 HYPOPLASTIC LEFT HEART SYNDROME: ICD-10-CM

## 2022-09-30 RX ORDER — DIGOXIN 250 MCG
250 TABLET ORAL 2 TIMES DAILY
Qty: 60 TABLET | Refills: 7 | Status: SHIPPED | OUTPATIENT
Start: 2022-09-30 | End: 2023-06-19

## 2022-09-30 NOTE — TELEPHONE ENCOUNTER
Patient last saw Dr. Grajeda on 4/18/22, and was told to follow-up in 1 year.  No upcoming appts have been scheduled.    This is a faxed refill request for Digoxin 250 mcg Tab from [facility] @ [address] [place], [place].    Last fill was 9/4/22

## 2022-12-08 ENCOUNTER — TELEPHONE (OUTPATIENT)
Dept: PEDIATRIC CARDIOLOGY | Facility: CLINIC | Age: 27
End: 2022-12-08

## 2022-12-08 DIAGNOSIS — Q23.4 HLHS (HYPOPLASTIC LEFT HEART SYNDROME): ICD-10-CM

## 2022-12-08 DIAGNOSIS — Z98.890 S/P FONTAN PROCEDURE: ICD-10-CM

## 2022-12-08 RX ORDER — LISINOPRIL 30 MG/1
30 TABLET ORAL DAILY
Qty: 30 TABLET | Refills: 5 | Status: SHIPPED | OUTPATIENT
Start: 2022-12-08 | End: 2023-06-19

## 2022-12-08 NOTE — TELEPHONE ENCOUNTER
Received phone request refill from pharmacy for Lisinopril 30mg. Patient last seen 4/18/2022. Will follow-up April 2023 but no visit scheduled yet. Refilled through May 2023 per nursing protocol.

## 2022-12-08 NOTE — TELEPHONE ENCOUNTER
Health Call Center    Phone Message    May a detailed message be left on voicemail: yes     Reason for Call: Medication Refill Request    Has the patient contacted the pharmacy for the refill? Yes   Name of medication being requested: Lisinopril 30mg  Provider who prescribed the medication: Dr. Nicci Rene  [facility]    Patient is needing a refill as soon as possible. Patient has been out of the medication for awhile but pharmacy has been giving some to keep him going on the medication until a refill has been sent. Please call the pharmacy with any questions.

## 2023-06-19 DIAGNOSIS — Q23.4 HLHS (HYPOPLASTIC LEFT HEART SYNDROME): ICD-10-CM

## 2023-06-19 DIAGNOSIS — Z98.890 S/P FONTAN PROCEDURE: ICD-10-CM

## 2023-06-19 DIAGNOSIS — Q23.4 HYPOPLASTIC LEFT HEART SYNDROME: ICD-10-CM

## 2023-06-19 RX ORDER — DIGOXIN 250 MCG
250 TABLET ORAL 2 TIMES DAILY
Qty: 60 TABLET | Refills: 0 | Status: SHIPPED | OUTPATIENT
Start: 2023-06-19 | End: 2023-07-10

## 2023-06-19 RX ORDER — LISINOPRIL 30 MG/1
30 TABLET ORAL DAILY
Qty: 30 TABLET | Refills: 0 | Status: SHIPPED | OUTPATIENT
Start: 2023-06-19 | End: 2023-07-10

## 2023-06-19 NOTE — TELEPHONE ENCOUNTER
Patient last saw Dr. Grajeda on 4/18/22, and has an upcoming appt scheduled for 7/10/23.      This is a faxed refill request for Digoxin 250 mcg Tab and   Lisiniopril 30 mg Tab from [facility] @ [address] [place], [place].      Last fill was 5/23/23

## 2023-07-10 ENCOUNTER — OFFICE VISIT (OUTPATIENT)
Dept: PEDIATRIC CARDIOLOGY | Facility: CLINIC | Age: 28
End: 2023-07-10
Payer: MEDICARE

## 2023-07-10 ENCOUNTER — ANCILLARY PROCEDURE (OUTPATIENT)
Dept: CARDIOLOGY | Facility: CLINIC | Age: 28
End: 2023-07-10
Payer: MEDICARE

## 2023-07-10 VITALS
HEART RATE: 66 BPM | BODY MASS INDEX: 18.48 KG/M2 | HEIGHT: 65 IN | SYSTOLIC BLOOD PRESSURE: 104 MMHG | WEIGHT: 110.89 LBS | DIASTOLIC BLOOD PRESSURE: 63 MMHG

## 2023-07-10 DIAGNOSIS — Z98.890 S/P FONTAN PROCEDURE: ICD-10-CM

## 2023-07-10 DIAGNOSIS — Q20.4 SINGLE COMMON VENTRICLE: ICD-10-CM

## 2023-07-10 DIAGNOSIS — Q23.4 HLHS (HYPOPLASTIC LEFT HEART SYNDROME): ICD-10-CM

## 2023-07-10 DIAGNOSIS — Q23.4 HYPOPLASTIC LEFT HEART SYNDROME: ICD-10-CM

## 2023-07-10 LAB
ATRIAL RATE - MUSE: 60 BPM
DIASTOLIC BLOOD PRESSURE - MUSE: NORMAL MMHG
INTERPRETATION ECG - MUSE: NORMAL
P AXIS - MUSE: 24 DEGREES
PR INTERVAL - MUSE: 140 MS
QRS DURATION - MUSE: 128 MS
QT - MUSE: 370 MS
QTC - MUSE: 370 MS
R AXIS - MUSE: 116 DEGREES
SYSTOLIC BLOOD PRESSURE - MUSE: NORMAL MMHG
T AXIS - MUSE: 48 DEGREES
VENTRICULAR RATE- MUSE: 60 BPM

## 2023-07-10 PROCEDURE — 99214 OFFICE O/P EST MOD 30 MIN: CPT | Mod: 25 | Performed by: PEDIATRICS

## 2023-07-10 PROCEDURE — 93303 ECHO TRANSTHORACIC: CPT | Performed by: PEDIATRICS

## 2023-07-10 PROCEDURE — 93325 DOPPLER ECHO COLOR FLOW MAPG: CPT | Performed by: PEDIATRICS

## 2023-07-10 PROCEDURE — 93320 DOPPLER ECHO COMPLETE: CPT | Performed by: PEDIATRICS

## 2023-07-10 RX ORDER — DIGOXIN 250 MCG
250 TABLET ORAL 2 TIMES DAILY
Qty: 60 TABLET | Refills: 0 | Status: SHIPPED | OUTPATIENT
Start: 2023-07-10 | End: 2023-08-18

## 2023-07-10 RX ORDER — LISINOPRIL 30 MG/1
30 TABLET ORAL DAILY
Qty: 30 TABLET | Refills: 0 | Status: SHIPPED | OUTPATIENT
Start: 2023-07-10 | End: 2023-08-18

## 2023-07-10 ASSESSMENT — PAIN SCALES - GENERAL: PAINLEVEL: NO PAIN (0)

## 2023-07-10 NOTE — NURSING NOTE
"Chief Complaint   Patient presents with     RECHECK     Hypoplastic left heart syndrome       /63 (BP Location: Right arm, Patient Position: Sitting, Cuff Size: Adult Regular)   Pulse 66   Ht 1.662 m (5' 5.43\")   Wt 50.3 kg (110 lb 14.3 oz)   BMI 18.21 kg/m      I have Reviewed the patients medications and allergies      Arcadio Sheppard LPN  July 10, 2023    "

## 2023-07-10 NOTE — PATIENT INSTRUCTIONS
[facility]   [facility] [place]      Pediatric Call Center Scheduling and Nurse Questions:  [phone]    After hours urgent matters that cannot wait until the next business day:  [phone].  Ask for the on-call pediatric doctor for the specialty you are calling for be paged.    For dermatology urgent matters that cannot wait until the next business day, is over a holiday and/or a weekend please call [phone] and ask for the Dermatology Resident On-Call to be paged.    Prescription Renewals:  Please call your pharmacy first.  Your pharmacy must fax requests to [phone].  Please allow 2-3 days for prescriptions to be authorized.    If your physician has ordered a CT or MRI, you may schedule this test by calling [facility] in [place] at [phone].    **If your child is having a sedated procedure, they will need a history and physical done at their Primary Care Provider within 30 days of the procedure.  If your child was seen by the ordering provider in our office within 30 days of the procedure, their visit summary will work for the H&P unless they inform you otherwise.  If you have any questions, please call the RN Care Coordinator.**       ADULT CONGENITAL HEART DISEASE CONTACT INFORMATION:    John Fitzpatrick MD  Adult Congenital Heart Disease and Cardiovascular Genetics Center    [facility] [address] [address]    [phone]  Fax: [phone]  After Hours: [phone]

## 2023-07-10 NOTE — PROGRESS NOTES
[facility] Clinic Note    Carlin is a 28 year old male with HLHS s/p Non-Fenestrated Fontan, s/p Fontan conduit stent on . Here for follow up    IMP: Carlin Stern is a 28 year old male with history of HLHS, s/p Non-Fenestrated Fontan procedure with developmental delay. Doing well.    Recently underwent Cardiac cath- 2022-Narrowing of the proximal Fontan conduit, which was successful stented using a 12 x 36 mm EV3 Max LD stent on a 20 mm BIB and post dilated with a 22 mm [place] balloon  ? Normal transpulmonary gradient of 4 mmHg, normal PVR of 1.77 indexed Woods units  ? Low-normal cardiac index of 2.26 L/min/m2    There was a 4 mmHg gradient from ascending to descending thoracic aorta    Echo is unchanged from previous.    No symptoms or concerns    PLAN:    May need Zio monitor to monitor arrhythmias in 1 year  No Activity Restrictions, recommend exercise daily and adequate hydration  Adherence to heart healthy diet, regular exercise habits, avoidance of tobacco products and maintenance of a healthy weight  Will adjust medications    Aspirin 81 mg every other day    Digoxin 250 mcg BID    Lisinopril 30 mg once daily  - SBE Prophylaxis needed for dental procedures  - Results were reviewed with the family.  - Follow-up with Pulmonologist and GI specialist - single ventricle physiology  - Transition to [facility] clinic for F/U in 1 year with Echo, EKG and O 2 sat. Information below was provided to patient's caregivers      ADULT CONGENITAL HEART DISEASE CONTACT INFORMATION:    John Fitzpatrick MD   Adult Congenital Heart Disease and Cardiovascular Genetics Center    [facility] [address] [address]    [phone]  Fax: [phone]  After Hours: [phone]    Patient Active Problem List   Diagnosis     Neuromuscular scoliosis of thoracolumbar region     HLHS (hypoplastic left heart syndrome)       Patient Active  Problem List    Diagnosis     Neuromuscular scoliosis of thoracolumbar region             Attending Attestation:     Echocardiographic images were reviewed by me.    History of Present Illness:   I was asked to see this patient by Primary Care Provider Basilio Rahman to consult regarding single ventricle.  s/p Meagan, and s/p Non-fenestrated Fontan procedure.   Carlin was last seen in 2022. He has undergone a Meagan, Jamie and Fontan procedures at [facility] in [place]. Carlin was adopted at the age of [age] and since the last follow-up visit, according to the parents, he has done well.     He  underwent MRI on 4/8/15 which showed mild narrowing of the proximal LPA segment with pulmonary distribution of 45% to rt. Lung and 55% to left lung. With dilated RV - EDV 80ml/m2 with moderate hypertrophy and EF of 38%.  Last treadmill test performed on 2/13/13: Normal ventilatory effort, Peak Vo2 low 61% of predicted. Low peak heart rate -indicating chronotropic incompetence. Reduced cardiac capacity.    He is developmentally delayed. His hearing is good. His left eye vision is abnormal and uses prescription glasses for this.     His medications include Digoxin 125 micrograms twice daily, aspirin 81 mg every other day and Lisinopril 20 mg once daily. Not taking Plavix, this was only prescribed for 3 months s/p fontan stent placement.    Has a sedentary lifestyle. Stays at home and helps at home, walks outside, rides his bike and plays basketball sporadically but mother says he mostly stays inside and does not exercise. He has no shortness of breath, chest pain, syncope or pre-syncope. No other major illnesses or concerns. No sleep problems. His parents reports that his appetite is very good but is unable to gain weight despite eating.  For his anxiety he is on Zoloft and follows with his psychologist    Cardiac cath: Jan 2016, RV pressure 83/9, RV sat 95%, Fontan pressure 11 mm Hg, normal PVR and  cardiac output. Fontan O 2 sat 73%. Normal flows across the branch pulmonary arteries.    Lab: Dec 2015, Hb- 13.9, pro BNP- 241, Troponin- less than 0.015, S.Cret 1.    Lab- 2019- Hb 15.1, BUN-25, cret- 1.06, pro     Lab- 2022-  N Terminal Pro BNP- 242, Troponin- less than 79 ng/L, LFT normal. Hb 15.7, low Plt count    CT chest- 2019- Hypoplastic left heart status post [place] procedure.Small aortopulmonary collaterals, but no major collateralization is appreciated.Patency of the Jamie, but with predominant uni-directional flow to the left pulmonary artery.Punctate right lower lobe nodule, likely postinfectious. Scoliosis.      Cath- 2022- Low filling pressures and systemic blood pressures (systolics in the 70s) on initial hemodynamics, mean SVC pressure of 8 mmHg and Fontan pressure of 12 mmHg    Patient given 700 ml bolus of normal saline, with improvement - mean Fontan pressure of 14 mmHg with 1 mmHg gradient into both the RPA and LPA. No gradient into SVC, IVC, hepatic vein. Normal transhepatic gradient of 1 mmHg   ? Normal transpulmonary gradient of 4 mmHg, normal PVR of 1.77 indexed Woods units  ? Low-normal cardiac index of 2.26 L/min/m2  ? There was a 4 mmHg gradient from ascending to descending thoracic aorta, no intervention performed    No evidence of significant AP collaterals    Single V-V collateral from left innominate vein to left pulmonary vein, left as pop-off    Narrowing of the proximal Fontan conduit, which was successful stented using a 12 x 36 mm EV3 Max LD stent on a 20 mm BIB and post dilated with a 22 mm [place] balloon              Abdominal U/S- IMPRESSION:  March 2022  1. Mildly coarsened hepatic echotexture may represent mild intrinsic  hepatic parenchymal disease.  2. Patent and antegrade hepatic Doppler evaluation.    PFT- Narrative    Although the FEV1 and FVC are reduced, the FEV1/FVC ratio is increased.  The inspiratory and expiratory flow rates are reduced.  The diffusing  "capacity is reduced.  However, the diffusing capacity was not corrected for the patient's hemoglobin.        I have reviewed past medical family and social history with the patient or family.    Past Medical History:   No Recent Hospitalizations  Hypoplastic left heart syndrome  S/p Meagan, Jamie, Non-fenestrated Fontan procedure  Developmental delay  S/P stent- Fontan     Family and Social History:   No history of congenital heart disease  Non-contributory         Review of Systems:   A comprehensive Review of Systems was performed is negative other than noted in the HPI  CV and Pulm ROS  are neg  No RIOS, sob, cyanosis, edema, cough, wheeze, syncope, chest pain, palpitations          Medications:   I have reviewed this patient's current medications    Current Outpatient Medications   Medication     aspirin (ASA) 81 MG tablet     digoxin (LANOXIN) 250 MCG tablet     lisinopril (ZESTRIL) 30 MG tablet     sertraline (ZOLOFT) 100 MG tablet     clopidogrel (PLAVIX) 75 MG tablet     No current facility-administered medications for this visit.         Physical Exam:     Blood pressure 104/63, pulse 66, height 1.662 m (5' 5.43\"), weight 50.3 kg (110 lb 14.3 oz).    General - NAD, awake, alert   HEENT - NC/AT EOMI   Cardiac - Pectus excavatum deformity at sternotomy scar, RRR, nl S1, single S2, systolic murmur grade 1/6 LLSB.  No click, thrill or heave   Respiratory - Lungs clear, no rales   Abdominal - Liver at RCM   Extremity  Nl pulses in brachial and femoral areas, No Clubbing, Edema, Cyanosis   Skin - Has chronic skin hyperpigmentation changes on his bilateral legs from hematomas and skin picking. Freckles on his bilateral arms and face. No rash.   Neuro - Nl gait, posture, tone         Labs     EKG results:      EKG with today's visit V-rate of 60/min, sinus, RBBB.  msec.      Echocardiography today:  Hypoplastic left heart syndrome. Patient has undergone Meagan, Jamie and Fontan operations. Large muscular " ventricular septal defect with bidirectional (systolic right to left) flow. 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 Post stenting of the Fontan pathway (3/22/22).    There is unobstructed flow in the left pulmonary artery. Qualitatively mild/moderately depressed right ventricular systolic function. Normal appearance and motion of the systemic tricuspid valve, with moderate central regurgitation.  Low-velocity unobstructed phasic flow in the superior vena cava, inferior vena cava, and proximal Fontan conduit. Unobstructed neoaortic outflow with mild insufficiency. Aortic arch is unobstructed. There is normal pulsatile flow in the abdominal aorta. No effusion. No significant change from last echocardiogram.    Patient Education: During this visit I discussed in detail the patient s symptoms, physical exam and evaluation results findings, tentative diagnosis as well as the treatment plan (Including but not limited to possible side effects and complications related to the disease, treatment modalities and intervention(s). Family expressed understanding and consent. Family was receptive and ready to learn; no apparent learning barriers were identified.    Sincerely,    Nicci Rene MD, [facility]  Pediatric Cardiologist    CC:   Copy to patient  ARTHUR MADRIGALBasilio Matt  [id number] [facility] [address]      Fontan Surveillance Protocol    For patients with no clinical concerns, periodic surveillance should be part of routine care. Basic testing should be performed for these patients, with additional follow up tests performed as needed.     Patients with clinical findings concerning for end organ damage should receive additional studies in addition to the basic testing, tailored to the specific clinical concerns. The in-depth testing can be used as a guide to direct further investigation.     Investigational studies should be considered after initial work-up has  been completed, and may involve consultation and coordination with additional specialties.     Cardiac Testing       Test Adult   Outpatient visit, including physical examination Every 6-12 mo   ECG Every 6-12 mo   Echocardiogram Yearly   Holter 24-hour monitor Every 1-2 y   Exercise stress test Every 1-2 y   Serum BNP or NT-proBNP Every 1-2 y   Cardiac MRI Every 2-3 y   CT angiography As clinically indicated   Cardiac catheterization Once every 10 y                 Adult Organ System Surveillance  Consider surveillance testing in the adult (>18 years) every 1-2 years    Organ System Basic In-Depth Investigational   Liver CMP Serum FibroSure biomarkers Liver biopsy     Platelet count Serum ?-fetoprotein       Serum GGT Liver imaging via CT or MRI       PT/INR Liver elastography (ultrasound or MRI)       Total serum cholesterol         Abdominal (liver) ultrasound       Kidney Serum BUN, creatinine Urinalysis albumin/creatinine ratio Nuclear scan GFR     Serum cystatin C Renal ultrasound with Doppler     Lymph Serum albumin, total protein Serum IgG T2-weighted MRI lymphatic imaging     Absolute lymphocyte count Fecal ?-1 antitrypsin level Lymphatic angiography   Endocrine/metabolic Serum calcium Parathyroid hormone Serum insulin-like growth factor     Vitamin D Bone densitometry/DXA scan       Nutritional evaluation and consultation       Heme CBC, Hgb, Hct Serum iron Coagulation factors       TIBC         Ferritin     Lungs Pulmonary function testing Chest x-ray     Neurological/psychological Psychological evaluation and consultation Neurodevelopmental /cognitive testing  Brain MRI scanning     Recommendations based on 2019 statement from the AHA:     Lucero J, Anurag AM, Cortes DS, Jonah BJ, Josh MA, Gewillig MH, Hsia T-Y, Reece DT, Philip AH, Jalen BW, Mario JW, Lazara NA, Mone M, Vinita DN, Paz KR, Alfonso BS, Ashley K, Jesus G, Dariusz ANTHONY, dion Hernández Y; on behalf of the American Heart  Association [place] on Cardiovascular Disease in [place] and [place] on Cardiovascular and Stroke Nursing. Evaluation and management of the child and adult with Fontan circulation: a scientific statement from the American Heart Association. Circulation. 2019;140:h167-s192. doi: 10.1161/CIR.7764215141334722.

## 2023-07-10 NOTE — LETTER
7/10/2023      RE: Carlin Stern  [address]  [facility] [address]     Dear Colleague,    Thank you for the opportunity to participate in the care of your patient, Carlin Stern, at the [facility]. Please see a copy of my visit note below.    [facility] Clinic Note    Carlin is a 28 year old male with HLHS s/p Non-Fenestrated Fontan, s/p Fontan conduit stent on 2022. Here for follow up    IMP: Carlin Stern is a 28 year old male with history of HLHS, s/p Non-Fenestrated Fontan procedure with developmental delay. Doing well.  Recently underwent Cardiac cath- March 2022-Narrowing of the proximal Fontan conduit, which was successful stented using a 12 x 36 mm EV3 Max LD stent on a 20 mm BIB and post dilated with a 22 mm [place] balloon  Normal transpulmonary gradient of 4 mmHg, normal PVR of 1.77 indexed Woods units  Low-normal cardiac index of 2.26 L/min/m2  There was a 4 mmHg gradient from ascending to descending thoracic aorta  Echo is unchanged from previous.  No symptoms or concerns    PLAN:    May need Zio monitor to monitor arrhythmias in 1 year  No Activity Restrictions, recommend exercise daily and adequate hydration  Adherence to heart healthy diet, regular exercise habits, avoidance of tobacco products and maintenance of a healthy weight  Will adjust medications  Aspirin 81 mg every other day  Digoxin 250 mcg BID  Lisinopril 30 mg once daily  - SBE Prophylaxis needed for dental procedures  - Results were reviewed with the family.  - Follow-up with Pulmonologist and GI specialist - single ventricle physiology  - Transition to [facility] clinic for F/U in 1 year with Echo, EKG and O 2 sat. Information below was provided to patient's caregivers      ADULT CONGENITAL HEART DISEASE CONTACT INFORMATION:    John Fitzpatrick MD   Adult Congenital Heart Disease and  [facility]    [facility]  [address] [place] [id number]    [phone]  Fax: [phone]  After Hours: [phone]    Patient Active Problem List   Diagnosis    Neuromuscular scoliosis of thoracolumbar region    HLHS (hypoplastic left heart syndrome)       Patient Active Problem List    Diagnosis    Neuromuscular scoliosis of thoracolumbar region             Attending Attestation:     Echocardiographic images were reviewed by me.    History of Present Illness:   I was asked to see this patient by Primary Care Provider Basilio Rahman to consult regarding single ventricle.  s/p Meagan, and s/p Non-fenestrated Fontan procedure.   Carlin was last seen in . He has undergone a Cinebar, Jamie and Fontan procedures at [facility] in [place]. Carlin was adopted at the age of [age] and since the last follow-up visit, according to the parents, he has done well.     He  underwent MRI on 4/8/15 which showed mild narrowing of the proximal LPA segment with pulmonary distribution of 45% to rt. Lung and 55% to left lung. With dilated RV - EDV 80ml/m2 with moderate hypertrophy and EF of 38%.  Last treadmill test performed on 13: Normal ventilatory effort, Peak Vo2 low 61% of predicted. Low peak heart rate -indicating chronotropic incompetence. Reduced cardiac capacity.    He is developmentally delayed. His hearing is good. His left eye vision is abnormal and uses prescription glasses for this.     His medications include Digoxin 125 micrograms twice daily, aspirin 81 mg every other day and Lisinopril 20 mg once daily. Not taking Plavix, this was only prescribed for 3 months s/p fontan stent placement.    Has a sedentary lifestyle. Stays at home and helps at home, walks outside, rides his bike and plays basketball sporadically but mother says he mostly stays inside and does not exercise. He has no shortness of breath, chest pain,  syncope or pre-syncope. No other major illnesses or concerns. No sleep problems. His parents reports that his appetite is very good but is unable to gain weight despite eating.  For his anxiety he is on Zoloft and follows with his psychologist    Cardiac cath: Jan 2016, RV pressure 83/9, RV sat 95%, Fontan pressure 11 mm Hg, normal PVR and cardiac output. Fontan O 2 sat 73%. Normal flows across the branch pulmonary arteries.    Lab: Dec 2015, Hb- 13.9, pro BNP- 241, Troponin- less than 0.015, S.Cret 1.    Lab- 2019- Hb 15.1, BUN-25, cret- 1.06, pro     Lab- 2022-  N Terminal Pro BNP- 242, Troponin- less than 79 ng/L, LFT normal. Hb 15.7, low Plt count    CT chest- 2019- Hypoplastic left heart status post [place] procedure.Small aortopulmonary collaterals, but no major collateralization is appreciated.Patency of the Jamie, but with predominant uni-directional flow to the left pulmonary artery.Punctate right lower lobe nodule, likely postinfectious. Scoliosis.    Cath- 2022- Low filling pressures and systemic blood pressures (systolics in the 70s) on initial hemodynamics, mean SVC pressure of 8 mmHg and Fontan pressure of 12 mmHg  Patient given 700 ml bolus of normal saline, with improvement - mean Fontan pressure of 14 mmHg with 1 mmHg gradient into both the RPA and LPA. No gradient into SVC, IVC, hepatic vein. Normal transhepatic gradient of 1 mmHg   Normal transpulmonary gradient of 4 mmHg, normal PVR of 1.77 indexed Woods units  Low-normal cardiac index of 2.26 L/min/m2  There was a 4 mmHg gradient from ascending to descending thoracic aorta, no intervention performed  No evidence of significant AP collaterals  Single V-V collateral from left innominate vein to left pulmonary vein, left as pop-off  Narrowing of the proximal Fontan conduit, which was successful stented using a 12 x 36 mm EV3 Max LD stent on a 20 mm BIB and post dilated with a 22 mm [place] balloon              Abdominal U/S- IMPRESSION:  March  "2022  1. Mildly coarsened hepatic echotexture may represent mild intrinsic  hepatic parenchymal disease.  2. Patent and antegrade hepatic Doppler evaluation.    PFT- Narrative    Although the FEV1 and FVC are reduced, the FEV1/FVC ratio is increased.  The inspiratory and expiratory flow rates are reduced.  The diffusing capacity is reduced.  However, the diffusing capacity was not corrected for the patient's hemoglobin.        I have reviewed past medical family and social history with the patient or family.    Past Medical History:   No Recent Hospitalizations  Hypoplastic left heart syndrome  S/p Meagan, Jamie, Non-fenestrated Fontan procedure  Developmental delay  S/P stent- Fontan     Family and Social History:   No history of congenital heart disease  Non-contributory         Review of Systems:   A comprehensive Review of Systems was performed is negative other than noted in the HPI  CV and Pulm ROS  are neg  No RIOS, sob, cyanosis, edema, cough, wheeze, syncope, chest pain, palpitations          Medications:   I have reviewed this patient's current medications    Current Outpatient Medications   Medication    aspirin (ASA) 81 MG tablet    digoxin (LANOXIN) 250 MCG tablet    lisinopril (ZESTRIL) 30 MG tablet    sertraline (ZOLOFT) 100 MG tablet    clopidogrel (PLAVIX) 75 MG tablet     No current facility-administered medications for this visit.         Physical Exam:     Blood pressure 104/63, pulse 66, height 1.662 m (5' 5.43\"), weight 50.3 kg (110 lb 14.3 oz).    General - NAD, awake, alert   HEENT - NC/AT EOMI   Cardiac - Pectus excavatum deformity at sternotomy scar, RRR, nl S1, single S2, systolic murmur grade 1/6 LLSB.  No click, thrill or heave   Respiratory - Lungs clear, no rales   Abdominal - Liver at [facility]   Extremity  Nl pulses in brachial and femoral areas, No Clubbing, Edema, Cyanosis   Skin - Has chronic skin hyperpigmentation changes on his bilateral legs from hematomas and skin picking. Freckles " on his bilateral arms and face. No rash.   Neuro - Nl gait, posture, tone         Labs     EKG results:      EKG with today's visit V-rate of 60/min, sinus, RBBB.  msec.      Echocardiography today:  Hypoplastic left heart syndrome. Patient has undergone Meagan, Jamie and Fontan operations. Large muscular ventricular septal defect with bidirectional (systolic right to left) flow. 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 Post stenting of the Fontan pathway (3/22/22).    There is unobstructed flow in the left pulmonary artery. Qualitatively mild/moderately depressed right ventricular systolic function. Normal appearance and motion of the systemic tricuspid valve, with moderate central regurgitation.  Low-velocity unobstructed phasic flow in the superior vena cava, inferior vena cava, and proximal Fontan conduit. Unobstructed neoaortic outflow with mild insufficiency. Aortic arch is unobstructed. There is normal pulsatile flow in the abdominal aorta. No effusion. No significant change from last echocardiogram.    Patient Education: During this visit I discussed in detail the patient s symptoms, physical exam and evaluation results findings, tentative diagnosis as well as the treatment plan (Including but not limited to possible side effects and complications related to the disease, treatment modalities and intervention(s). Family expressed understanding and consent. Family was receptive and ready to learn; no apparent learning barriers were identified.    Sincerely,    Nicci Rene MD, AMEE  Pediatric Cardiologist    CC:   Copy to patient  ROB MADRIGAL James  [id number]  [facility] [address]      Fontan Surveillance Protocol    For patients with no clinical concerns, periodic surveillance should be part of routine care. Basic testing should be performed for these patients, with additional follow up tests performed as needed.     Patients with  clinical findings concerning for end organ damage should receive additional studies in addition to the basic testing, tailored to the specific clinical concerns. The in-depth testing can be used as a guide to direct further investigation.     Investigational studies should be considered after initial work-up has been completed, and may involve consultation and coordination with additional specialties.     Cardiac Testing       Test Adult   Outpatient visit, including physical examination Every 6-12 mo   ECG Every 6-12 mo   Echocardiogram Yearly   Holter 24-hour monitor Every 1-2 y   Exercise stress test Every 1-2 y   Serum BNP or NT-proBNP Every 1-2 y   Cardiac MRI Every 2-3 y   CT angiography As clinically indicated   Cardiac catheterization Once every 10 y                 Adult Organ System Surveillance  Consider surveillance testing in the adult (>18 years) every 1-2 years    Organ System Basic In-Depth Investigational   Liver CMP Serum FibroSure biomarkers Liver biopsy     Platelet count Serum ?-fetoprotein       Serum GGT Liver imaging via CT or MRI       PT/INR Liver elastography (ultrasound or MRI)       Total serum cholesterol         Abdominal (liver) ultrasound       Kidney Serum BUN, creatinine Urinalysis albumin/creatinine ratio Nuclear scan GFR     Serum cystatin C Renal ultrasound with Doppler     Lymph Serum albumin, total protein Serum IgG T2-weighted MRI lymphatic imaging     Absolute lymphocyte count Fecal ?-1 antitrypsin level Lymphatic angiography   Endocrine/metabolic Serum calcium Parathyroid hormone Serum insulin-like growth factor     Vitamin D Bone densitometry/DXA scan       Nutritional evaluation and consultation       Heme CBC, Hgb, Hct Serum iron Coagulation factors       TIBC         Ferritin     Lungs Pulmonary function testing Chest x-ray     Neurological/psychological Psychological evaluation and consultation Neurodevelopmental /cognitive testing  Brain MRI scanning      Recommendations based on 2019 statement from the AHA:     Lucero J, Anurag AM, Cortes DS, Jonah BJ, Josh MA, Gewillig MH, Malaika T-Y, Shanell DT, Philip AH, Jalen BW, Mario JW, Lazara NA, Mone M, Vinita DN, Paz KR, Alfonso BS, Ashley K, Jesus G, Dariusz AK, d [facility] Y; on behalf of the American Heart Association [place] on Cardiovascular Disease in the [place] and [place] on Cardiovascular and Stroke Nursing. Evaluation and management of the child and adult with Fontan circulation: a scientific statement from the American Heart Association. Circulation. 2019;140:m076-d788. doi: 10.1161/CIR.7211982173743127.                Please do not hesitate to contact me if you have any questions/concerns.     Sincerely,       INDRA Sanchez

## 2023-07-12 ENCOUNTER — TELEPHONE (OUTPATIENT)
Dept: CARDIOLOGY | Facility: CLINIC | Age: 28
End: 2023-07-12
Payer: MEDICARE

## 2023-07-14 ENCOUNTER — TELEPHONE (OUTPATIENT)
Dept: CARDIOLOGY | Facility: CLINIC | Age: 28
End: 2023-07-14
Payer: MEDICARE

## 2023-07-14 ENCOUNTER — HOSPITAL ENCOUNTER (OUTPATIENT)
Dept: CARDIOLOGY | Facility: CLINIC | Age: 28
Discharge: HOME | End: 2023-07-14
Attending: PEDIATRICS
Payer: MEDICARE

## 2023-07-14 DIAGNOSIS — Q23.4 HLHS (HYPOPLASTIC LEFT HEART SYNDROME): ICD-10-CM

## 2023-07-14 DIAGNOSIS — Z98.890 S/P FONTAN PROCEDURE: ICD-10-CM

## 2023-07-14 DIAGNOSIS — Q23.4 HYPOPLASTIC LEFT HEART SYNDROME: ICD-10-CM

## 2023-07-14 NOTE — PATIENT INSTRUCTIONS
Teaching Flowsheet   Relevant Diagnosis: HPLH  Teaching Topic: NII     Person(s) involved in teaching:   Mother  ZIO MAIL OUT     Motivation Level:  Asks Questions: Yes  Eager to Learn: Yes  Cooperative: Yes  Receptive (willing/able to accept information): Yes  Any cultural factors/Hoahaoism beliefs that may influence understanding or compliance? No    Instructional Materials Used/Given: Reviewed diary and proper care of monitor with patient and parent/guardian. Family instructed to return monitor via /mailbox after monitor is taken off. For questions or problems, call [facility] with number provided 24/7.     Time Spent:  15 Minutes

## 2023-07-17 PROCEDURE — 93244 EXT ECG>48HR<7D REV&INTERPJ: CPT | Performed by: PEDIATRICS

## 2023-08-18 DIAGNOSIS — Q23.4 HYPOPLASTIC LEFT HEART SYNDROME: ICD-10-CM

## 2023-08-18 DIAGNOSIS — Z98.890 S/P FONTAN PROCEDURE: ICD-10-CM

## 2023-08-18 DIAGNOSIS — Q23.4 HLHS (HYPOPLASTIC LEFT HEART SYNDROME): ICD-10-CM

## 2023-08-18 RX ORDER — DIGOXIN 250 MCG
250 TABLET ORAL 2 TIMES DAILY
Qty: 60 TABLET | Refills: 10 | Status: SHIPPED | OUTPATIENT
Start: 2023-08-18 | End: 2024-07-15

## 2023-08-18 RX ORDER — LISINOPRIL 30 MG/1
30 TABLET ORAL DAILY
Qty: 30 TABLET | Refills: 10 | Status: SHIPPED | OUTPATIENT
Start: 2023-08-18 | End: 2024-07-15

## 2023-08-18 NOTE — TELEPHONE ENCOUNTER
Patient last saw Dr. Grajeda on 7/10/23, and was told to follow-up in 1 year with Adult Congenital, no upcoming appts have been scheduled.      This is a faxed refill request for Digoxin 250 mcg Tab and Lisinopril 30 mg Tab from [facility] @ [address] [place], [place].      Last fill was 7/19/23

## 2023-08-18 NOTE — TELEPHONE ENCOUNTER
Faxed refill request for Digoxin 250 mcg Tab and Lisinopril 30 mg Tab from [facility]. Refilled per cardiology nursing protocol.

## 2024-01-31 ENCOUNTER — TELEPHONE (OUTPATIENT)
Dept: PEDIATRIC CARDIOLOGY | Facility: CLINIC | Age: 29
End: 2024-01-31
Payer: MEDICARE

## 2024-01-31 NOTE — TELEPHONE ENCOUNTER
Received a fax that Carlin was approved to continue Digoxin 0.25 mg Tab, one tab twice a daily through 12/31/24.

## 2024-07-15 ENCOUNTER — TELEPHONE (OUTPATIENT)
Dept: PEDIATRIC CARDIOLOGY | Facility: CLINIC | Age: 29
End: 2024-07-15
Payer: MEDICARE

## 2024-07-15 DIAGNOSIS — Q23.4 HYPOPLASTIC LEFT HEART SYNDROME: Primary | ICD-10-CM

## 2024-07-15 DIAGNOSIS — Q23.4 HLHS (HYPOPLASTIC LEFT HEART SYNDROME): ICD-10-CM

## 2024-07-15 DIAGNOSIS — Z98.890 S/P FONTAN PROCEDURE: ICD-10-CM

## 2024-07-15 RX ORDER — LISINOPRIL 30 MG/1
30 TABLET ORAL DAILY
Qty: 30 TABLET | Refills: 0 | Status: SHIPPED | OUTPATIENT
Start: 2024-07-15 | End: 2024-08-09

## 2024-07-15 RX ORDER — DIGOXIN 250 MCG
250 TABLET ORAL 2 TIMES DAILY
Qty: 60 TABLET | Refills: 0 | Status: SHIPPED | OUTPATIENT
Start: 2024-07-15 | End: 2024-08-09

## 2024-07-15 NOTE — TELEPHONE ENCOUNTER
Per Dr. Grajeda's last visit note on 7/10/2023, patient is to follow up with adult congenital cardiology in 1 year. No appointments have been made at this time. Adult referral entered, so their team will contact family to schedule that visit.  Refilled a one month refill.

## 2024-07-15 NOTE — TELEPHONE ENCOUNTER
[facility] Call Center    Phone Message    May a detailed message be left on voicemail: yes     Reason for Call: Medication Refill Request    Has the patient contacted the pharmacy for the refill? Yes   Name of medication being requested: digoxin (LANOXIN) 250 MCG tablet & lisinopril (ZESTRIL) 30 MG tablet  Provider who prescribed the medication:  Nicci Rene MBBS  Pharmacy: [facility] [address]  Date medication is needed: As soon as possible      Pharmacy is needing new prescriptions on both medications as patient has ran out of refills. Please follow up.     Action Taken: Other: Peds Cardio    Travel Screening: Not Applicable

## 2024-07-30 ENCOUNTER — TELEPHONE (OUTPATIENT)
Dept: CARDIOLOGY | Facility: CLINIC | Age: 29
End: 2024-07-30
Payer: MEDICARE

## 2024-07-30 DIAGNOSIS — I49.9 CARDIAC ARRHYTHMIA, UNSPECIFIED: ICD-10-CM

## 2024-07-30 DIAGNOSIS — Z98.890 S/P FONTAN PROCEDURE: ICD-10-CM

## 2024-07-30 DIAGNOSIS — Q23.4 HYPOPLASTIC LEFT HEART SYNDROME: ICD-10-CM

## 2024-07-30 DIAGNOSIS — R93.1 ABNORMAL FINDINGS ON DIAGNOSTIC IMAGING OF HEART AND CORONARY CIRCULATION: ICD-10-CM

## 2024-07-30 DIAGNOSIS — Q23.4 HLHS (HYPOPLASTIC LEFT HEART SYNDROME): Primary | ICD-10-CM

## 2024-07-30 NOTE — TELEPHONE ENCOUNTER
"Date: 7/30/2024    Time of Call: 9:27 AM     Diagnosis:  HLHS     [ TORB ] Ordering provider: Dr. Cassie Carlin  Order: Establish care with Dr. Carlin with ECHO, EKG and labs prior.      Order received by: Linda DEUTSCH RN     Follow-up/additional notes: Last saw Dr. Grajeda in summer 2023  \"PLAN:    May need Zio monitor to monitor arrhythmias in 1 year  No Activity Restrictions, recommend exercise daily and adequate hydration  Adherence to heart healthy diet, regular exercise habits, avoidance of tobacco products and maintenance of a healthy weight  Will adjust medications  Aspirin 81 mg every other day  Digoxin 250 mcg BID  Lisinopril 30 mg once daily  - SBE Prophylaxis needed for dental procedures  - Results were reviewed with the family.  - Follow-up with Pulmonologist and GI specialist - single ventricle physiology  - Transition to [facility] clinic for F/U in 1 year with Echo, EKG and O 2 sat. Information below was provided to patient's caregivers\"      "

## 2024-07-30 NOTE — Clinical Note
Prepped: groin. Prepped with: ChloraPrep. The patient was draped. .Pre-procedure site marking:N/A negative

## 2024-07-30 NOTE — TELEPHONE ENCOUNTER
Spoke with father, Basilio, to explain transition of care from Pediatric clinic to Adult Congenital Heart clinic, and that it was Dr. Grajeda's recommendation for the patient to see a congenital cardiologist as part of his transition to adult care.     Plan to cancel General Cardiology appointment with Dr. Lockhart, and help establish care with Dr. Carlin with ECHO, EKG and labs prior.     Clinic phone number provided, and patient's father is in agreement with plan.

## 2024-08-01 DIAGNOSIS — Q23.4 HLHS (HYPOPLASTIC LEFT HEART SYNDROME): Primary | ICD-10-CM

## 2024-08-01 DIAGNOSIS — Q23.4 HYPOPLASTIC LEFT HEART SYNDROME: ICD-10-CM

## 2024-08-01 DIAGNOSIS — Z98.890 S/P FONTAN PROCEDURE: ICD-10-CM

## 2024-08-01 NOTE — TELEPHONE ENCOUNTER
Pt is tentatively scheduled for their appts. Called pt father back to inform him that the time that was offer for the echo was inaccurate and needed to know if the day prior would work. He did not answer and a detailed message was left.

## 2024-08-09 ENCOUNTER — TELEPHONE (OUTPATIENT)
Dept: PEDIATRIC CARDIOLOGY | Facility: CLINIC | Age: 29
End: 2024-08-09
Payer: MEDICARE

## 2024-08-09 DIAGNOSIS — Q23.4 HYPOPLASTIC LEFT HEART SYNDROME: ICD-10-CM

## 2024-08-09 DIAGNOSIS — Q23.4 HLHS (HYPOPLASTIC LEFT HEART SYNDROME): ICD-10-CM

## 2024-08-09 DIAGNOSIS — Z98.890 S/P FONTAN PROCEDURE: ICD-10-CM

## 2024-08-09 RX ORDER — LISINOPRIL 30 MG/1
30 TABLET ORAL DAILY
Qty: 30 TABLET | Refills: 0 | Status: SHIPPED | OUTPATIENT
Start: 2024-08-09 | End: 2024-09-10

## 2024-08-09 RX ORDER — DIGOXIN 250 MCG
250 TABLET ORAL 2 TIMES DAILY
Qty: 60 TABLET | Refills: 0 | Status: SHIPPED | OUTPATIENT
Start: 2024-08-09 | End: 2024-09-04

## 2024-08-09 NOTE — TELEPHONE ENCOUNTER
Patient scheduled with adult cardiology on 8/30/2024.    Refilled a one month supply of medication per nursing protocol.

## 2024-08-09 NOTE — TELEPHONE ENCOUNTER
Health Call Center    Phone Message    May a detailed message be left on voicemail: yes     Reason for Call: Medication Refill Request    Has the patient contacted the pharmacy for the refill? Request from pharmacy  Name of medication being requested: TWO medications: 1) digoxin (LANOXIN) 250 MCG tablet;  2) lisinopril (ZESTRIL) 30 MG tablet   Provider who prescribed the medication: Nicci Rene MBBS  Pharmacy: [facility] [address]   Date medication is needed: asap, patient out        Action Taken: Message routed to:  Other: p peds card    Travel Screening: Not Applicable     Date of Service:

## 2024-08-27 NOTE — TELEPHONE ENCOUNTER
RECORDS RECEIVED FROM:    DATE RECEIVED:    GENERAL RECORDS STATUS DETAILS   OFFICE NOTE from cardiologists N/A    LABS Internal    EKG (STRIPS & REPORTS) Internal 7-10-23   MONITORS (STRIPS & REPORTS) Internal 7-14-23   ECHOS (IMAGES AND REPORTS) Internal 7-10-23   CONGENITAL AND GENETICS     CATH (ALL IMAGES AND REPORTS FROM BIRTH - PRESENT) Internal 3-22-22   ULTRASOUNDS (carotid, extremities)  (IMAGES AND REPORTS) Internal 3-23-22   CT/CTA (ALL IMAGES AND REPORTS FROM BIRTH - PRESENT) Internal CTA C 9-3-19   CHEST XRAY Internal 3-28-22

## 2024-08-29 NOTE — PROGRESS NOTES
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     He is fully saturated on room air with a small VV collateral at cath and no significant AP collaterals.     He has not had lymphatic complications.      He is asymptomatic and doing well. I will do a baseline holter today and plan to do a CPX and echo at his next visit. He should have surveillance MRI in the 1-2 years.     He is on lisinopril, which I will continue to support single-V function and to benefit his dejan-AI and TR. He is also on digoxin, the dose is somewhat high 250mcg BID. I will discuss with his peds cardiologist to understand the dosing and also check a digoxin level.  Based on this, I will re-evaluate his dosing.  He is on aspirin every other day due to history of bleeding. He has not had a history thrombotic complication or arrhythmia, so I will leave the dose for now and consider platelet function testing with next labs.    Family wants to defer hepatology consult which I think is reasonable. I will do a surveillance liver US to screen for HCC. If there are any concerns we will re-consider referral.    PFT's were abnormal but technically limited in the past. No current respiratory symptoms. Will consider repeat PFT's in the future.      Plan:  Anatomic class: III  Physiologic class: C for moderate TR, mild to moderate dejan AI  AHA Guideline testing status:  CPX: Last No recent; Due now, will order for next visit  Holter: Last 2023, Due today will order today  MRI/CT: Last 2019, Due next 1-2 years  Fontan surveillance:  Cath: Last 2022  Holter: 7/2023  Labs: 8/30/2024    Liver evaluation:   Last US: 2022- repeat now  Hepatology Referral: never  PFT's 2022 (see EPIC results- possible severe restrictive lung disease, but test was technically limited)     HLHS s/p Fontan:  Status:  Fenestration: No  Typical O2 saturations: >90%  Exercise capacity: Preserved, recommended beginning exercise program. No recent CPX.  Single V function: mildly reduced echo 8/30/2024 -   AV valve function: moderate TR echo 8/30/2024 -   Fontan obstruction: s/p stent angioplasty 2022 for anatomical narrowing  PA obstruction: No by cath 2022  VV collaterals: Yes, 1 at cath 2022- no intervention  AP Collaterals: Yes small by CT 2019, not seen at cath 2022  Other residual structural disease: VSD  Arrhythmia complications:  Sinus node function: HR  on holter  SVE: <1%  Ventricular ectopy: Ventricular bigeminy, no VT  Lymphatic complications: no  FALD: VAST 1 (thrombocytopenia)  Kidney funtion: mildly elevated Cr, GFR 90  Transplant candidacy: Began discussion of potential need for transplant in the future 8/30/2024     Plan:  Cardiac:  Holter today 7 day  CPX next visit  Check digoxin level  MRI in next 1-2 years  Continue lisinopril, consider dose reduction of digoxin.    Anticoagulation: Continue ASA every other day, will check platelet function studies at next visit    FALD:  Liver US this year, consider hepatology referral in next 1-2 years    Pulmonology: Consider repeat PFT's in next 1-2 years    Renal: normal GFR, Check Cys C    Goals of care: Continue to review. Currently full resuscitation and consideration of transplant if needed.    Cardiac Meds:  Digoxin 250 BID--> check level and holter then consider dose reduction  ASA 81 every other day--> platelet function labs next year  Lisinopirl 30mg daily    Follow up: 12 months  Testing at time of follow up: CPX, echo, EKG, labs (including platelet function)  Endocarditis prophylaxis indicated: yes  Activity Restrictions: No      Notes for other  Providers caring for Carlin Stern:      Recommendations for PCP: Carlin Stern should have routine follow up in the [facility] clinic. Using AHA/ACC guidelines this should be at least 6-12 months. Other recommendations: Routine preventive care.     Recommendations for Emergency Providers: Carlin has complex congenital heart disease that may lead to cardiac symptoms and may be associated with other non-cardiac comorbidities. Please discuss any concerns with [facility] cardiology.     Recommendations for Anesthesia: Carlin Stern  does require congenital cardiac anesthesia for any sedated procedures. Carlin Stern does require [facility] Consultation prior to any procedure.      Dr. NORMA Carlin  Adult Congenital Heart Disease    I spent 45 minutes during this visit reviewing the chart, performing a history and physical examination, counseling the patient and documenting the encounter.      ____________________    CC: HLHS    HPI:  I had the pleasure of seeing Carlin Stern  in the [facility] clinic in consultation for his history of congenital heart disease. 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  His complete cardiac history is listed below.  Briefly he underwent single ventricle palliation with [place], Bidirectional Jamie and non-fenestrated Fontan.       He is here today to establish [facility] care. He has previously been followed by Dr. Rene of Pediatric Cardiology. He has no concerns today. He is not particularly active. He has not had chest pain, shortness of breath, cyanosis, pallor, orthopnea, edema, palpitations, near or yulissa syncope.     Congenital Cardiac History  Initial Diagnosis: HLHS (MS, AS) with large VSD  HLHS: ([place], Jamie, Non-fenestrated Fontan-- no op notes, appears to be lateral tunnel?)  Fenestration present:no  Fontan Obstruction: Yes anatomical narrowing (no gradient by cath) 2022 Stent angioplasty  of Fontan  PA Obstruction: MRI 2015: mild narrowing of dejan-LPA (Right 45%, Left 55%)  Pulmonary Hypertension: Normal PVR cath 2022  AV valve regurgitation: moderate TV regurgitation, no MV regurgitaiton  Semi-lunar valve dysfunction: mild to moderate dejan AI, no native AI  Single ventricle function: mildly reduced  Single ventricle diastolic function: normal EDP at cath 2022  Thrombotic complications: No previous  Lymphatic Complications: No known  Veno-venous collaterals: 2022 cath single VV collateral from left innominate to left pulmonary vein  AP collaterals: No (cath 2022)  Fontan Associated Liver Disease: US 2022: coarsened hepatic echotexture   Pulmonary disease: PFT's 2023- technically limited, possible for severe restrictive lung disease  Kidney Function: Mildly elevated Cr    Surgical/Transcatheter Interventions  [place]: [place]  Jamie: [place]  Non-fenestrated Fontan: [place]  Cath 2022: SBP 70's, SVC 8, Fontan 12--> 700cc bolus, Fontan 14, 1mmHg gradient to the RPA and LPA, No gradient to SVC, IVC< hepatic vein, THVPG1, TPG 4, PVRi 1.77, CI 2.2, 4mmHg gradient from asacending to descending aorta; no AP collaterals, single VV collateral from left innominate to left pulmonary vein, narrowing of proximal Fontan stented with [id number] EV3 Max LD stent post-dilated to 22mm    Arrhythmia issues: No significant          Past Medical History:   Diagnosis Date    Congenital heart disease     HLHS     Past Surgical History:   Procedure Laterality Date    CARDIAC SURGERY      [place], Jamie, Fontan    HEART CATH CHILD N/A 3/11/2016    Procedure: HEART CATH CHILD;  Surgeon: Rashaun Marques MD;  [facility]: [facility] OR    PEDS HEART CATHETERIZATION N/A 3/22/2022    Procedure: Heart Catheterization, angiography, possible balloon dilation and stenting of vessels, possible collateral closure;  Surgeon: Rashaun Marques MD;  Location: [facility] HEART PEDS CARDIAC CATH LAB     Family History    Family history unknown: Yes     Social History     Tobacco Use    Smoking status: Never    Smokeless tobacco: Never         Current Outpatient Medications:     aspirin (ASA) 81 MG tablet, Take 1 tablet (81 mg) by mouth daily (Patient taking differently: Take 81 mg by mouth four times a week Every other day), Disp: 60 tablet, Rfl: 1    clopidogrel (PLAVIX) 75 MG tablet, Take 1 tablet (75 mg) by mouth daily (with dinner), Disp: 90 tablet, Rfl: 0    digoxin (LANOXIN) 250 MCG tablet, Take 1 tablet (250 mcg) by mouth 2 times daily, Disp: 60 tablet, Rfl: 0    lisinopril (ZESTRIL) 30 MG tablet, Take 1 tablet (30 mg) by mouth daily, Disp: 30 tablet, Rfl: 0    sertraline (ZOLOFT) 100 MG tablet, Take 100 mg by mouth daily, Disp: , Rfl: 2   No Known Allergies      There were no vitals taken for this visit.  Physical Exam  Vitals reviewed.   Constitutional:       General: He is not in acute distress.     Appearance: Normal appearance. He is not ill-appearing, toxic-appearing or diaphoretic.   HENT:      Head: Normocephalic and atraumatic.      Right Ear: External ear normal.      Left Ear: External ear normal.      Nose: Nose normal. No congestion or rhinorrhea.      Mouth/Throat:      Mouth: Mucous membranes are moist.   Eyes:      General: No scleral icterus.        Right eye: No discharge.         Left eye: No discharge.      Conjunctiva/sclera: Conjunctivae normal.   Cardiovascular:      Comments: Well healed sternotomy. Normal S1/S2. Gr II/VI holosystolic murmur. No diastolic murmurs. No rubs/gallops. R/L radial and PT pulsese 2+  Pulmonary:      Effort: Pulmonary effort is normal.      Breath sounds: Normal breath sounds. No wheezing or rales.   Abdominal:      General: Abdomen is flat. Bowel sounds are normal. There is no distension.      Palpations: Abdomen is soft.   Musculoskeletal:         General: No deformity.      Cervical back: Normal range of motion.      Right lower leg: No edema.      Left lower leg: No  edema.   Skin:     General: Skin is warm.      Capillary Refill: Capillary refill takes less than 2 seconds.   Neurological:      General: No focal deficit present.      Mental Status: He is alert. Mental status is at baseline.   Psychiatric:         Mood and Affect: Mood normal.           I personally reviewed the following studies and have given my interpretation below:    EK2024 NSR with sinus arrhythmia vs. Sinus pauses. RVH, RAD, RBBB    Echo: 2024 There is HLHS variant with mitral/aortic stenosis and large VSD s/p [place], Jamie, presume lateral tunnel Fontan. With limited views, there is low velocity laminar flow through the Fontan, Jamie and b/l PA's. Atrial communication is unobstructed. There is mild to moderate TR, no significant MR.  Systemic ventricle function is mildly reduced. No obstruction of native or dejan-aortic valve, mild to moderate dejan-AI. No arch obstruction.    Holter 2023        Labs:  CBC RESULTS:   Recent Labs   Lab Test 24  0805   WBC 4.8   RBC 4.98   HGB 15.5   HCT 43.0   MCV 86   MCH 31.1   MCHC 36.0   RDW 12.7   PLT 82*     Last Comprehensive Metabolic Panel:  Lab Results   Component Value Date     2024    POTASSIUM 4.4 2024    CHLORIDE 102 2024    CO2 24 2024    ANIONGAP 12 2024    GLC 72 2024    BUN 26.1 (H) 2024    CR 1.14 2024    GFRESTIMATED 90 2024    LESA 10.1 2024     BNP: pending  AST: 25  ALT: 26  AFP <1.8  GGT: Pending  Cys C: Pending

## 2024-08-30 ENCOUNTER — HOSPITAL ENCOUNTER (OUTPATIENT)
Dept: CARDIOLOGY | Facility: CLINIC | Age: 29
Discharge: HOME | End: 2024-08-30
Attending: STUDENT IN AN ORGANIZED HEALTH CARE EDUCATION/TRAINING PROGRAM
Payer: MEDICARE

## 2024-08-30 ENCOUNTER — LAB (OUTPATIENT)
Dept: LAB | Facility: CLINIC | Age: 29
End: 2024-08-30
Attending: STUDENT IN AN ORGANIZED HEALTH CARE EDUCATION/TRAINING PROGRAM
Payer: MEDICARE

## 2024-08-30 ENCOUNTER — TELEPHONE (OUTPATIENT)
Dept: CARDIOLOGY | Facility: CLINIC | Age: 29
End: 2024-08-30

## 2024-08-30 ENCOUNTER — OFFICE VISIT (OUTPATIENT)
Dept: CARDIOLOGY | Facility: CLINIC | Age: 29
End: 2024-08-30
Attending: STUDENT IN AN ORGANIZED HEALTH CARE EDUCATION/TRAINING PROGRAM
Payer: MEDICARE

## 2024-08-30 ENCOUNTER — PRE VISIT (OUTPATIENT)
Dept: CARDIOLOGY | Facility: CLINIC | Age: 29
End: 2024-08-30
Payer: MEDICARE

## 2024-08-30 VITALS
SYSTOLIC BLOOD PRESSURE: 101 MMHG | OXYGEN SATURATION: 96 % | BODY MASS INDEX: 19.05 KG/M2 | WEIGHT: 116 LBS | HEART RATE: 75 BPM | DIASTOLIC BLOOD PRESSURE: 60 MMHG

## 2024-08-30 DIAGNOSIS — R93.1 ABNORMAL FINDINGS ON DIAGNOSTIC IMAGING OF HEART AND CORONARY CIRCULATION: ICD-10-CM

## 2024-08-30 DIAGNOSIS — R74.8 ABNORMAL LEVELS OF OTHER SERUM ENZYMES: ICD-10-CM

## 2024-08-30 DIAGNOSIS — Z98.890 S/P FONTAN PROCEDURE: ICD-10-CM

## 2024-08-30 DIAGNOSIS — Q23.4 HYPOPLASTIC LEFT HEART SYNDROME: ICD-10-CM

## 2024-08-30 DIAGNOSIS — I50.22 CHRONIC SYSTOLIC HEART FAILURE (H): ICD-10-CM

## 2024-08-30 DIAGNOSIS — Q23.4 HLHS (HYPOPLASTIC LEFT HEART SYNDROME): ICD-10-CM

## 2024-08-30 DIAGNOSIS — Q23.4 HYPOPLASTIC LEFT HEART SYNDROME: Primary | ICD-10-CM

## 2024-08-30 DIAGNOSIS — R09.89 OTHER SPECIFIED SYMPTOMS AND SIGNS INVOLVING THE CIRCULATORY AND RESPIRATORY SYSTEMS: ICD-10-CM

## 2024-08-30 DIAGNOSIS — I50.9 HEART FAILURE (H): ICD-10-CM

## 2024-08-30 DIAGNOSIS — Q23.4 HLHS (HYPOPLASTIC LEFT HEART SYNDROME): Primary | ICD-10-CM

## 2024-08-30 DIAGNOSIS — R00.1 BRADYCARDIA: ICD-10-CM

## 2024-08-30 DIAGNOSIS — I49.9 CARDIAC ARRHYTHMIA, UNSPECIFIED: ICD-10-CM

## 2024-08-30 LAB
ACANTHOCYTES BLD QL SMEAR: ABNORMAL
AFP SERPL-MCNC: <1.8 NG/ML
ALBUMIN SERPL BCG-MCNC: 4.8 G/DL (ref 3.5–5.2)
ALP SERPL-CCNC: 57 U/L (ref 40–150)
ALT SERPL W P-5'-P-CCNC: 26 U/L (ref 0–70)
ANION GAP SERPL CALCULATED.3IONS-SCNC: 12 MMOL/L (ref 7–15)
AST SERPL W P-5'-P-CCNC: 25 U/L (ref 0–45)
AT III ACT/NOR PPP CHRO: 106 % (ref 85–135)
AUER BODIES BLD QL SMEAR: ABNORMAL
BASO STIPL BLD QL SMEAR: ABNORMAL
BASOPHILS # BLD AUTO: 0 10E3/UL (ref 0–0.2)
BASOPHILS NFR BLD AUTO: 0 %
BILIRUB SERPL-MCNC: 0.6 MG/DL
BITE CELLS BLD QL SMEAR: ABNORMAL
BLISTER CELLS BLD QL SMEAR: ABNORMAL
BUN SERPL-MCNC: 26.1 MG/DL (ref 6–20)
BURR CELLS BLD QL SMEAR: ABNORMAL
CALCIUM SERPL-MCNC: 10.1 MG/DL (ref 8.8–10.4)
CHLORIDE SERPL-SCNC: 102 MMOL/L (ref 98–107)
CREAT SERPL-MCNC: 1.14 MG/DL (ref 0.67–1.17)
CYSTATIN C (ROCHE): 1.5 MG/L (ref 0.6–1)
DACRYOCYTES BLD QL SMEAR: ABNORMAL
DIGOXIN SERPL-MCNC: 2.4 NG/ML (ref 0.6–2)
EGFRCR SERPLBLD CKD-EPI 2021: 90 ML/MIN/1.73M2
ELLIPTOCYTES BLD QL SMEAR: SLIGHT
EOSINOPHIL # BLD AUTO: 0.2 10E3/UL (ref 0–0.7)
EOSINOPHIL NFR BLD AUTO: 4 %
ERYTHROCYTE [DISTWIDTH] IN BLOOD BY AUTOMATED COUNT: 12.7 % (ref 10–15)
FRAGMENTS BLD QL SMEAR: ABNORMAL
GFR/BSA.PRED SERPLBLD CYS-BASED-ARV: 52 ML/MIN/1.73M2
GGT SERPL-CCNC: 30 U/L (ref 8–61)
GLUCOSE SERPL-MCNC: 72 MG/DL (ref 70–99)
HCO3 SERPL-SCNC: 24 MMOL/L (ref 22–29)
HCT VFR BLD AUTO: 43 % (ref 40–53)
HGB BLD-MCNC: 15.5 G/DL (ref 13.3–17.7)
HGB C CRYSTALS: ABNORMAL
HOWELL-JOLLY BOD BLD QL SMEAR: ABNORMAL
IGG SERPL-MCNC: 1058 MG/DL (ref 610–1616)
IMM GRANULOCYTES # BLD: 0 10E3/UL
IMM GRANULOCYTES NFR BLD: 0 %
LYMPHOCYTES # BLD AUTO: 1.3 10E3/UL (ref 0.8–5.3)
LYMPHOCYTES NFR BLD AUTO: 27 %
MCH RBC QN AUTO: 31.1 PG (ref 26.5–33)
MCHC RBC AUTO-ENTMCNC: 36 G/DL (ref 31.5–36.5)
MCV RBC AUTO: 86 FL (ref 78–100)
MONOCYTES # BLD AUTO: 0.3 10E3/UL (ref 0–1.3)
MONOCYTES NFR BLD AUTO: 6 %
NEUTROPHILS # BLD AUTO: 3 10E3/UL (ref 1.6–8.3)
NEUTROPHILS NFR BLD AUTO: 63 %
NEUTS HYPERSEG BLD QL SMEAR: ABNORMAL
NRBC # BLD AUTO: 0 10E3/UL
NRBC BLD AUTO-RTO: 0 /100
NT-PROBNP SERPL-MCNC: 607 PG/ML (ref 0–450)
PLAT MORPH BLD: ABNORMAL
PLATELET # BLD AUTO: 82 10E3/UL (ref 150–450)
POLYCHROMASIA BLD QL SMEAR: ABNORMAL
POTASSIUM SERPL-SCNC: 4.4 MMOL/L (ref 3.4–5.3)
PREALB SERPL-MCNC: 25.6 MG/DL (ref 20–40)
PROT SERPL-MCNC: 7.7 G/DL (ref 6.4–8.3)
RBC # BLD AUTO: 4.98 10E6/UL (ref 4.4–5.9)
RBC AGGLUT BLD QL: ABNORMAL
RBC MORPH BLD: ABNORMAL
ROULEAUX BLD QL SMEAR: ABNORMAL
SICKLE CELLS BLD QL SMEAR: ABNORMAL
SMUDGE CELLS BLD QL SMEAR: ABNORMAL
SODIUM SERPL-SCNC: 138 MMOL/L (ref 135–145)
SPHEROCYTES BLD QL SMEAR: ABNORMAL
STOMATOCYTES BLD QL SMEAR: ABNORMAL
TARGETS BLD QL SMEAR: ABNORMAL
TOXIC GRANULES BLD QL SMEAR: ABNORMAL
TSH SERPL DL<=0.005 MIU/L-ACNC: 4.01 UIU/ML (ref 0.3–4.2)
VARIANT LYMPHS BLD QL SMEAR: ABNORMAL
WBC # BLD AUTO: 4.8 10E3/UL (ref 4–11)

## 2024-08-30 PROCEDURE — 80162 ASSAY OF DIGOXIN TOTAL: CPT

## 2024-08-30 PROCEDURE — 93242 EXT ECG>48HR<7D RECORDING: CPT | Performed by: STUDENT IN AN ORGANIZED HEALTH CARE EDUCATION/TRAINING PROGRAM

## 2024-08-30 PROCEDURE — 83880 ASSAY OF NATRIURETIC PEPTIDE: CPT

## 2024-08-30 PROCEDURE — 99204 OFFICE O/P NEW MOD 45 MIN: CPT | Performed by: STUDENT IN AN ORGANIZED HEALTH CARE EDUCATION/TRAINING PROGRAM

## 2024-08-30 PROCEDURE — 93005 ELECTROCARDIOGRAM TRACING: CPT

## 2024-08-30 PROCEDURE — 82610 CYSTATIN C: CPT

## 2024-08-30 PROCEDURE — 93010 ELECTROCARDIOGRAM REPORT: CPT | Performed by: INTERNAL MEDICINE

## 2024-08-30 PROCEDURE — G0463 HOSPITAL OUTPT CLINIC VISIT: HCPCS | Mod: 25 | Performed by: STUDENT IN AN ORGANIZED HEALTH CARE EDUCATION/TRAINING PROGRAM

## 2024-08-30 PROCEDURE — 93325 DOPPLER ECHO COLOR FLOW MAPG: CPT

## 2024-08-30 PROCEDURE — 82105 ALPHA-FETOPROTEIN SERUM: CPT

## 2024-08-30 PROCEDURE — 80053 COMPREHEN METABOLIC PANEL: CPT

## 2024-08-30 PROCEDURE — 93320 DOPPLER ECHO COMPLETE: CPT | Mod: 26 | Performed by: PEDIATRICS

## 2024-08-30 PROCEDURE — 93325 DOPPLER ECHO COLOR FLOW MAPG: CPT | Mod: 26 | Performed by: PEDIATRICS

## 2024-08-30 PROCEDURE — 84134 ASSAY OF PREALBUMIN: CPT

## 2024-08-30 PROCEDURE — 85300 ANTITHROMBIN III ACTIVITY: CPT

## 2024-08-30 PROCEDURE — 93303 ECHO TRANSTHORACIC: CPT | Mod: 26 | Performed by: PEDIATRICS

## 2024-08-30 PROCEDURE — 82977 ASSAY OF GGT: CPT

## 2024-08-30 PROCEDURE — 36415 COLL VENOUS BLD VENIPUNCTURE: CPT

## 2024-08-30 PROCEDURE — 82784 ASSAY IGA/IGD/IGG/IGM EACH: CPT

## 2024-08-30 PROCEDURE — 85025 COMPLETE CBC W/AUTO DIFF WBC: CPT

## 2024-08-30 PROCEDURE — 84443 ASSAY THYROID STIM HORMONE: CPT

## 2024-08-30 ASSESSMENT — PAIN SCALES - GENERAL: PAINLEVEL: NO PAIN (0)

## 2024-08-30 NOTE — LETTER
8/30/2024      RE: Carlin Stern  [address]  [facility] [address]       Dear Colleague,    Thank you for the opportunity to participate in the care of your patient, Carlin Stern, at the [facility] at [facility]. Please see a copy of my visit note below.    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     He is fully saturated on room air with a small VV collateral at cath and no significant AP collaterals.     He has not had lymphatic complications.      He is asymptomatic and doing well. I will do a baseline holter today and plan to do a CPX and echo at his next visit. He should have surveillance MRI in the 1-2 years.     He is on lisinopril, which I will continue to support single-V function and to benefit his dejan-AI and TR. He is also on digoxin, the dose is somewhat high 250mcg BID. I will discuss with his peds cardiologist to understand the dosing and also check a digoxin level.  Based on this, I will re-evaluate his dosing.  He is on aspirin every other day due to history of bleeding. He has not had a history thrombotic complication or arrhythmia, so I will leave the dose for now and consider platelet function testing with next labs.    Family wants to defer hepatology consult which I think is reasonable. I will do a surveillance liver US to screen for HCC. If there are any concerns we will re-consider referral.    PFT's were abnormal but technically limited in the past. No current respiratory symptoms. Will consider repeat PFT's in the  future.      Plan:  Anatomic class: III  Physiologic class: C for moderate TR, mild to moderate dejan AI  AHA Guideline testing status:  CPX: Last No recent; Due now, will order for next visit  Holter: Last 2023, Due today will order today  MRI/CT: Last 2019, Due next 1-2 years  Fontan surveillance:  Cath: Last 2022  Holter: 7/2023  Labs: 8/30/2024   Liver evaluation:   Last US: 2022- repeat now  Hepatology Referral: never  PFT's 2022 (see EPIC results- possible severe restrictive lung disease, but test was technically limited)     HLHS s/p Fontan:  Status:  Fenestration: No  Typical O2 saturations: >90%  Exercise capacity: Preserved, recommended beginning exercise program. No recent CPX.  Single V function: mildly reduced echo 8/30/2024 -   AV valve function: moderate TR echo 8/30/2024 -   Fontan obstruction: s/p stent angioplasty 2022 for anatomical narrowing  PA obstruction: No by cath 2022  VV collaterals: Yes, 1 at cath 2022- no intervention  AP Collaterals: Yes small by CT 2019, not seen at cath 2022  Other residual structural disease: VSD  Arrhythmia complications:  Sinus node function: HR  on holter  SVE: <1%  Ventricular ectopy: Ventricular bigeminy, no VT  Lymphatic complications: no  FALD: VAST 1 (thrombocytopenia)  Kidney funtion: mildly elevated Cr, GFR 90  Transplant candidacy: Began discussion of potential need for transplant in the future 8/30/2024     Plan:  Cardiac:  Holter today 7 day  CPX next visit  Check digoxin level  MRI in next 1-2 years  Continue lisinopril, consider dose reduction of digoxin.    Anticoagulation: Continue ASA every other day, will check platelet function studies at next visit    FALD:  Liver US this year, consider hepatology referral in next 1-2 years    Pulmonology: Consider repeat PFT's in next 1-2 years    Renal: normal GFR, Check Cys C    Goals of care: Continue to review. Currently full resuscitation and consideration of transplant if needed.    Cardiac  Meds:  Digoxin 250 BID--> check level and holter then consider dose reduction  ASA 81 every other day--> platelet function labs next year  Lisinopirl 30mg daily    Follow up: 12 months  Testing at time of follow up: CPX, echo, EKG, labs (including platelet function)  Endocarditis prophylaxis indicated: yes  Activity Restrictions: No      Notes for other Providers caring for Carlin Stern:      Recommendations for PCP: Carlin Stern should have routine follow up in the [facility] clinic. Using AHA/ACC guidelines this should be at least 6-12 months. Other recommendations: Routine preventive care.     Recommendations for Emergency Providers: Carlin has complex congenital heart disease that may lead to cardiac symptoms and may be associated with other non-cardiac comorbidities. Please discuss any concerns with [facility] cardiology.     Recommendations for Anesthesia: Carlin Stern  does require congenital cardiac anesthesia for any sedated procedures. Carlin Stern does require [facility] Consultation prior to any procedure.      Dr. NORMA Carlin  Adult Congenital Heart Disease    I spent 45 minutes during this visit reviewing the chart, performing a history and physical examination, counseling the patient and documenting the encounter.      ____________________    CC: HLHS    HPI:  I had the pleasure of seeing Carlin Stern  in the [facility] clinic in consultation for his history of congenital heart disease. 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  His complete cardiac history is listed below.  Briefly he underwent single ventricle palliation with [place], Bidirectional Jamie and non-fenestrated Fontan.       He is here today to establish [facility] care. He has previously been followed by Dr. Rene of Pediatric Cardiology. He has no concerns today. He is not particularly active. He has not had chest pain, shortness of breath,  cyanosis, pallor, orthopnea, edema, palpitations, near or yulissa syncope.     Congenital Cardiac History  Initial Diagnosis: HLHS (MS, AS) with large VSD  HLHS: ([place], Jamie, Non-fenestrated Fontan-- no op notes, appears to be lateral tunnel?)  Fenestration present:no  Fontan Obstruction: Yes anatomical narrowing (no gradient by cath) 2022 Stent angioplasty of Fontan  PA Obstruction: MRI 2015: mild narrowing of dejan-LPA (Right 45%, Left 55%)  Pulmonary Hypertension: Normal PVR cath 2022  AV valve regurgitation: moderate TV regurgitation, no MV regurgitaiton  Semi-lunar valve dysfunction: mild to moderate dejan AI, no native AI  Single ventricle function: mildly reduced  Single ventricle diastolic function: normal EDP at cath 2022  Thrombotic complications: No previous  Lymphatic Complications: No known  Veno-venous collaterals: 2022 cath single VV collateral from left innominate to left pulmonary vein  AP collaterals: No (cath 2022)  Fontan Associated Liver Disease: US 2022: coarsened hepatic echotexture   Pulmonary disease: PFT's 2023- technically limited, possible for severe restrictive lung disease  Kidney Function: Mildly elevated Cr    Surgical/Transcatheter Interventions  [place]: [place]  Jamie: [place]  Non-fenestrated Fontan: [place]  Cath 2022: SBP 70's, SVC 8, Fontan 12--> 700cc bolus, Fontan 14, 1mmHg gradient to the RPA and LPA, No gradient to SVC, IVC< hepatic vein, THVPG1, TPG 4, PVRi 1.77, CI 2.2, 4mmHg gradient from asacending to descending aorta; no AP collaterals, single VV collateral from left innominate to left pulmonary vein, narrowing of proximal Fontan stented with [id number] EV3 Max LD stent post-dilated to 22mm    Arrhythmia issues: No significant          Past Medical History:   Diagnosis Date     Congenital heart disease     HLHS     Past Surgical History:   Procedure Laterality Date     CARDIAC SURGERY      [place], Jamie, Fontan     HEART CATH CHILD N/A 3/11/2016    Procedure: HEART  CATH CHILD;  Surgeon: Rashaun Marques MD;  [facility] OR     PEDS HEART CATHETERIZATION N/A 3/22/2022    Procedure: Heart Catheterization, angiography, possible balloon dilation and stenting of vessels, possible collateral closure;  Surgeon: Rashaun Marques MD;  [facility]: [facility] HEART PEDS CARDIAC CATH LAB     Family History   Family history unknown: Yes     Social History     Tobacco Use     Smoking status: Never     Smokeless tobacco: Never         Current Outpatient Medications:      aspirin (ASA) 81 MG tablet, Take 1 tablet (81 mg) by mouth daily (Patient taking differently: Take 81 mg by mouth four times a week Every other day), Disp: 60 tablet, Rfl: 1     clopidogrel (PLAVIX) 75 MG tablet, Take 1 tablet (75 mg) by mouth daily (with dinner), Disp: 90 tablet, Rfl: 0     digoxin (LANOXIN) 250 MCG tablet, Take 1 tablet (250 mcg) by mouth 2 times daily, Disp: 60 tablet, Rfl: 0     lisinopril (ZESTRIL) 30 MG tablet, Take 1 tablet (30 mg) by mouth daily, Disp: 30 tablet, Rfl: 0     sertraline (ZOLOFT) 100 MG tablet, Take 100 mg by mouth daily, Disp: , Rfl: 2   No Known Allergies      There were no vitals taken for this visit.  Physical Exam  Vitals reviewed.   Constitutional:       General: He is not in acute distress.     Appearance: Normal appearance. He is not ill-appearing, toxic-appearing or diaphoretic.   HENT:      Head: Normocephalic and atraumatic.      Right Ear: External ear normal.      Left Ear: External ear normal.      Nose: Nose normal. No congestion or rhinorrhea.      Mouth/Throat:      Mouth: Mucous membranes are moist.   Eyes:      General: No scleral icterus.        Right eye: No discharge.         Left eye: No discharge.      Conjunctiva/sclera: Conjunctivae normal.   Cardiovascular:      Comments: Well healed sternotomy. Normal S1/S2. Gr II/VI holosystolic murmur. No diastolic murmurs. No rubs/gallops. R/L radial and PT pulsese 2+  Pulmonary:      Effort:  Pulmonary effort is normal.      Breath sounds: Normal breath sounds. No wheezing or rales.   Abdominal:      General: Abdomen is flat. Bowel sounds are normal. There is no distension.      Palpations: Abdomen is soft.   Musculoskeletal:         General: No deformity.      Cervical back: Normal range of motion.      Right lower leg: No edema.      Left lower leg: No edema.   Skin:     General: Skin is warm.      Capillary Refill: Capillary refill takes less than 2 seconds.   Neurological:      General: No focal deficit present.      Mental Status: He is alert. Mental status is at baseline.   Psychiatric:         Mood and Affect: Mood normal.           I personally reviewed the following studies and have given my interpretation below:    EK2024 NSR with sinus arrhythmia vs. Sinus pauses. RVH, RAD, RBBB    Echo: 2024 There is HLHS variant with mitral/aortic stenosis and large VSD s/p [place], Jamie, presume lateral tunnel Fontan. With limited views, there is low velocity laminar flow through the Fontan, Jamie and b/l PA's. Atrial communication is unobstructed. There is mild to moderate TR, no significant MR.  Systemic ventricle function is mildly reduced. No obstruction of native or dejan-aortic valve, mild to moderate dejan-AI. No arch obstruction.    Holter 2023        Labs:  CBC RESULTS:   Recent Labs   Lab Test 24  0805   WBC 4.8   RBC 4.98   HGB 15.5   HCT 43.0   MCV 86   MCH 31.1   MCHC 36.0   RDW 12.7   PLT 82*     Last Comprehensive Metabolic Panel:  Lab Results   Component Value Date     2024    POTASSIUM 4.4 2024    CHLORIDE 102 2024    CO2 24 2024    ANIONGAP 12 2024    GLC 72 2024    BUN 26.1 (H) 2024    CR 1.14 2024    GFRESTIMATED 90 2024    LESA 10.1 2024     BNP: pending  AST: 25  ALT: 26  AFP <1.8  GGT: Pending  Cys C: Pending          Please do not hesitate to contact me if you have any questions/concerns.      Sincerely,     Cassie Carlin MD

## 2024-08-30 NOTE — NURSING NOTE
Carlin Stern arrived here on 8/30/2024 12:23 PM for 3-7 Days  Zio monitor placement per ordering provider Dr. Carlin for the diagnosis Heart Failure and others.  Patient s skin was prepped per protocol. Dr. Marrero is the supervising MD.  Zio monitor was placed.  Instructions were reviewed with and given to the patient.  Patient verbalized understanding of wear, troubleshooting and monitor return instructions.

## 2024-08-30 NOTE — PATIENT INSTRUCTIONS
Thank you for visiting the Adult Congenital and Cardiovascular Genetics Clinic at the [facility].    Cardiology Providers you saw during your visit:  NORMA Carlin MD    Diagnosis:  HLHS    Results:  NORMA Carlin MD reviewed the results of your EKG, labs and ECHO testing today in clinic.    If you have questions or concerns, please call us at [phone] or contact us through [employer].  ______________________________________________________________________________    Recommendations from your Cardiology Provider:    Please wear a ZioPatch for 1 week (48 hours minimum). We will let you know about result.  We will send a referral for a liver ultrasound to be completed closer to home.   Please try to ride your bike for 30 minutes twice a week.       General Cardiac Recommendations:  Continue to eat a heart healthy, low salt diet.  Continue to get 20-30 minutes of aerobic activity, 4-5 days per week.  Examples of aerobic activity include walking, running, swimming, cycling, etc.  Continue to observe good oral hygiene, with regular dental visits.    ____________________________________________________________________________________________________    SBE prophylaxis:   Yes__X__  No____    SBE prophylaxis applies to patients with certain heart conditions who are recommended to take antibiotics before dental appointments and other specific procedures. These antibiotics are to help prevent an infection of the heart (endocarditis) that certain patients are at higher risk of developing. The guidelines used come from the American Heart Association and are periodically updated.    If YES is checked, follow the recommendations outlined below:  Take antibiotic(s) prior to recommended dental procedures and procedures involving the respiratory tract or procedures involving infections of the skin, muscle or bones.   SBE prophylaxis is not needed for routine gastrointestinal and genitourinary procedures  (ie. Colonoscopy or vaginal delivery)  Observe good oral hygiene daily, as advised by your dentist. Get regular professional dental care.  Keep cuts and other open injuries clean.  All infections should be treated as soon as possible.  Symptoms of Infective Endocarditis could include: fever lasting more than 4-5 days or a recurrent fever that initially resolves but returns within 1-2 days). Call us a [phone] if you are experiencing these symptoms.    ____________________________________________________________________________________________________    FASTING CHOLESTEROL was checked in the last 5 years YES____  NO__X__   If no, please follow up with your primary care physician. You should have a cholesterol screening every 5 years at minimum, and every year if taking a medication for your cholesterol levels.     ____________________________________________________________________________________________________    Follow-up Plan:  Follow-up with Dr. Carlin in 1 year with an echocardiogram, cardiopulmonary stress test and labs prior.     CardioPulmonary Stress Test (CPX)  CPX is a maximal (meaning you exercise to exhaustion, not to achieve a heart rate) exercise test where we measure how well your heart/body uses oxygen or energy. It is the gold standard for measuring functional capacity and helps us differentiate limitations due to lungs, heart, or fitness.   A CPX is NOT a typical stress test. You will NOT be asked to hold your Beta Blocker medication.   You will be scheduled for a CardioPulmonary Stress Test at the [facility] ([address], [facility] [address], [phone]).  Follow these instructions:    1. Report to the GOLD waiting room in the [facility] hospital.  2. Nothing to eat for 3 hours prior to your test. You may have clear liquids up to the time of your test.  3. Please wear loose, two-piece clothing and comfortable, rubber-soled shoes for walking.       What happens during  "this test?   We will place a blood pressure cuff on your arm. We will also attach small pads to your chest. The pads are hooked to an EKG (electrocardiogram) machine that shows how your heart is working.  You will walk on a treadmill or pedal a bicycle.  You will breathe through a mouthpiece, and the air you breathe out will be measured at rest and during exercise.  We will watch your EKG, heart rate and heart rhythm the entire time.  We will check your blood pressure during the test.  This test takes two (2) hours.      ____________________________________________________________________________________________________    If you have questions or concerns, please call us at [phone] or contact us through [employer].    Linda Pennington RN, BSN    Connie Fuentes (Scheduling)  Nurse Care Coordinator     Clinic   Adult Congenital and CV Genetics  Adult Congenital   [facility]  [facility]  (P) [phone]     (P) [phone]  (F) [phone]     (F) [phone]          For after hours urgent needs, call [phone] and ask to speak to the \"On-Call Cardiologist.\"    For emergencies call [phone].    [facility]  [facility]  Mail Code [id number]  [address] [place] [address], [place]  [address]    "

## 2024-08-30 NOTE — NURSING NOTE
Chief Complaint   Patient presents with    New Patient     29 year old male with history of HLHS presenting for evaluation.       Vitals were taken, medications reconciled and EKG performed.    Luis Angel Lakhani, Visit Facilitator  11:01 AM

## 2024-08-30 NOTE — PROGRESS NOTES
Reviewed labs.  Dig level elevated (admittedly not drawn at optimal time interval). Will reduce dose to 250mcg daily and plan to repeat dig level in 4 weeks (~ 6 hours after morning dose).    Note elevated BNP, will use this as baseline as he asymptomatic.    Cys C is also slighlty elevated with GFR of 57.  Will monitor kidney function.    Dr. NORMA Carlin  Adult Congenital Heart Disease

## 2024-08-30 NOTE — TELEPHONE ENCOUNTER
Date: 8/30/2024    Time of Call: 3:53 PM     Diagnosis:  HLHS     [ TORB ] Ordering provider: Dr. Cassie Carlin  Order: DECREASE digoxin to 250mcg daily, BMP and dig level in 4 weeks (to be drawn 6 hours after taking morning dose of dig)      Order received by: Linda DEUTSCH RN      Follow-up/additional notes:

## 2024-09-03 LAB
ATRIAL RATE - MUSE: 64 BPM
DIASTOLIC BLOOD PRESSURE - MUSE: NORMAL MMHG
INTERPRETATION ECG - MUSE: NORMAL
P AXIS - MUSE: 0 DEGREES
PR INTERVAL - MUSE: 142 MS
QRS DURATION - MUSE: 120 MS
QT - MUSE: 368 MS
QTC - MUSE: 379 MS
R AXIS - MUSE: 128 DEGREES
SYSTOLIC BLOOD PRESSURE - MUSE: NORMAL MMHG
T AXIS - MUSE: 48 DEGREES
VENTRICULAR RATE- MUSE: 64 BPM

## 2024-09-04 RX ORDER — DIGOXIN 250 MCG
250 TABLET ORAL DAILY
Qty: 90 TABLET | Refills: 3 | Status: SHIPPED | OUTPATIENT
Start: 2024-09-04

## 2024-09-04 NOTE — TELEPHONE ENCOUNTER
Discussed lab results and medication change with father, Basilio. Basilio states that ultrasound is scheduled for tomorrow at [facility]. Discussed lab draw for 4 weeks after decreased digoxin dose, father states understanding.    BMP and dig level lab orders faxed to [facility] at [phone]

## 2024-09-09 ENCOUNTER — TELEPHONE (OUTPATIENT)
Dept: PEDIATRIC CARDIOLOGY | Facility: CLINIC | Age: 29
End: 2024-09-09
Payer: MEDICARE

## 2024-09-09 DIAGNOSIS — Z98.890 S/P FONTAN PROCEDURE: ICD-10-CM

## 2024-09-09 DIAGNOSIS — Q23.4 HLHS (HYPOPLASTIC LEFT HEART SYNDROME): ICD-10-CM

## 2024-09-09 NOTE — TELEPHONE ENCOUNTER
[facility] Call Center    Phone Message    May a detailed message be left on voicemail: yes     Reason for Call: Medication Refill Request    Has the patient contacted the pharmacy for the refill? Yes   Name of medication being requested: lisinopril (ZESTRIL) 30 MG tablet   Provider who prescribed the medication: Dr Nguyen  Pharmacy: [facility]  Date medication is needed: n/a         Action Taken: Message routed to:  Other: p peds cardiology [place]    Travel Screening: Not Applicable     Date of Service:

## 2024-09-10 PROCEDURE — 93248 EXT ECG>7D<15D REV&INTERPJ: CPT | Performed by: INTERNAL MEDICINE

## 2024-09-10 RX ORDER — LISINOPRIL 30 MG/1
30 TABLET ORAL DAILY
Qty: 90 TABLET | Refills: 3 | Status: SHIPPED | OUTPATIENT
Start: 2024-09-10

## 2024-09-10 NOTE — TELEPHONE ENCOUNTER
Patient is now established for Care with Dr. Cailin Carlin, last seen 8/30/24. Sent to Adult CV nursing team.

## 2024-09-11 ENCOUNTER — TELEPHONE (OUTPATIENT)
Dept: CARDIOLOGY | Facility: CLINIC | Age: 29
End: 2024-09-11
Payer: MEDICARE

## 2024-09-11 NOTE — TELEPHONE ENCOUNTER
Spoke with patient's father about zio patch results (normal). Verified pt did decrease Digoxin to 250 per day. Lab drawn is scheduled at [facility] in [facility] on 10/4 at 3 pm. Pt's family aware level should be drawn 6 hours after dose. Patient verbalized understanding and is in agreement to the plan.

## 2024-09-13 NOTE — TELEPHONE ENCOUNTER
"LVM asking for callback to discuss recent liver ultrasound-- did state on message that nothing was urgent and that things looked as expected, but needed to discuss some details.     \"1.  Trace ascites along the inferior margin of the right lobe of the liver. Mildly inhomogeneous hepatic parenchyma without focal mass.  2.  Normal sonographic appearance of the gallbladder, right kidney and spleen. Spleen is upper normal in size.  3.  Normal abdominal liver duplex.  Narrative    For Patients: As a result of the 21st Century Cures Act, medical imaging exams and procedure reports are released immediately into your electronic medical record. You may view this report before your referring provider. If you have questions, please contact your health care provider.    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  TECHNIQUE: Limited abdominal ultrasound. Color flow with spectral Doppler and waveform analysis performed.    FINDINGS:    GALLBLADDER: Normal. No gallstones, wall thickening, or pericholecystic fluid. Negative sonographic Wagner's sign.    BILE DUCTS: No biliary dilatation. The common duct measures 3.7 mm.    LIVER: Mildly inhomogeneous hepatic parenchyma without focal mass. Smooth hepatic contour. There is a very small amount of fluid inferior to the right lobe of liver which may represent minimal ascites.      RIGHT KIDNEY: 8.7 x 4.1 x 3.4 cm with a normal sonographic appearance.    PANCREAS: The visualized portions are normal.    Spleen: 12.4 x 12.6 x 4.4 cm, upper normal in size without focal mass.    Trace ascites. \"  "

## 2024-10-07 ENCOUNTER — RESULTS ONLY (OUTPATIENT)
Dept: LAB | Facility: CLINIC | Age: 29
End: 2024-10-07
Payer: MEDICARE

## 2024-10-08 LAB
ANION GAP SERPL CALC-SCNC: 7 MMOL/L (ref 5–18)
BUN SERPL-MCNC: 23 MG/DL (ref 8–25)
BUN/CREATININE RATIO (EXTERNAL): 21 (ref 10–20)
CALCIUM (EXTERNAL): 9.9 MG/DL (ref 8.4–10.2)
CHLORIDE (EXTERNAL): 104 MMOL/L (ref 98–107)
CO2 (EXTERNAL): 23 MMOL/L (ref 22–31)
CREATININE (EXTERNAL): 1.07 MG/DL (ref 0.72–1.25)
GFR ESTIMATED (EXTERNAL): >90 ML/MIN/1.73M2
GLUCOSE (EXTERNAL): 97 MG/DL (ref 70–99)
Lab: 1.2 NG/ML (ref 0.5–1.1)
POTASSIUM (EXTERNAL): 4.8 MMOL/L (ref 3.5–5.1)
SODIUM (EXTERNAL): 139 MMOL/L (ref 136–145)

## 2024-10-11 ENCOUNTER — TELEPHONE (OUTPATIENT)
Dept: CARDIOLOGY | Facility: CLINIC | Age: 29
End: 2024-10-11
Payer: MEDICARE

## 2024-10-11 NOTE — TELEPHONE ENCOUNTER
LVM asking for callback.    Need to discuss decreasing digoxin and also adding liver US to appt next year.

## 2024-10-11 NOTE — TELEPHONE ENCOUNTER
----- Message from Cassie Carlin sent at 10/9/2024  9:37 AM CDT -----  Let's reduce digoxin to 125mcg daily and hold it there. Can we add a liver US to his appt next year?  AS  ----- Message -----  From: Linda Pennington RN  Sent: 10/8/2024  12:28 PM CDT  To: MD Dani Jack,    What do you think of this dig level after going down on the dosage from 250mcg BID to 250mcg daily?    BMP looks mostly normal to me, with potassium at an acceptable level (4.8).    He had a liver ultrasound that I tried calling about but they did not get back to me FYI.    Current plan is for him to follow-up with you next summer with ECHO, CPX and labs.    -Linda  ----- Message -----  From: Leighton Hlapft  Sent: 10/8/2024  11:58 AM CDT  To: Linda Pennington, RN

## 2024-11-10 ENCOUNTER — HEALTH MAINTENANCE LETTER (OUTPATIENT)
Age: 29
End: 2024-11-10

## 2025-08-29 DIAGNOSIS — Z87.74 S/P FONTAN PROCEDURE: ICD-10-CM

## 2025-08-29 DIAGNOSIS — Q23.4 HYPOPLASTIC LEFT HEART SYNDROME: ICD-10-CM

## 2025-08-29 DIAGNOSIS — Q23.4 HLHS (HYPOPLASTIC LEFT HEART SYNDROME): ICD-10-CM

## 2025-08-29 RX ORDER — DIGOXIN 250 MCG
250 TABLET ORAL DAILY
Qty: 90 TABLET | Status: CANCELLED | OUTPATIENT
Start: 2025-08-29

## 2025-08-29 RX ORDER — LISINOPRIL 30 MG/1
30 TABLET ORAL DAILY
Qty: 90 TABLET | Status: CANCELLED | OUTPATIENT
Start: 2025-08-29

## 2025-08-30 DIAGNOSIS — Q23.4 HLHS (HYPOPLASTIC LEFT HEART SYNDROME): ICD-10-CM

## 2025-08-30 DIAGNOSIS — Q23.4 HYPOPLASTIC LEFT HEART SYNDROME: ICD-10-CM

## 2025-08-30 DIAGNOSIS — Z87.74 S/P FONTAN PROCEDURE: ICD-10-CM

## 2025-08-30 RX ORDER — LISINOPRIL 30 MG/1
30 TABLET ORAL DAILY
Qty: 90 TABLET | Refills: 3 | Status: SHIPPED | OUTPATIENT
Start: 2025-08-30 | End: 2025-08-30

## 2025-08-30 RX ORDER — DIGOXIN 250 MCG
250 TABLET ORAL DAILY
Qty: 90 TABLET | Refills: 3 | Status: SHIPPED | OUTPATIENT
Start: 2025-08-30 | End: 2025-08-30

## 2025-08-30 RX ORDER — DIGOXIN 250 MCG
250 TABLET ORAL DAILY
Qty: 90 TABLET | Refills: 3 | Status: SHIPPED | OUTPATIENT
Start: 2025-08-30

## 2025-08-30 RX ORDER — LISINOPRIL 30 MG/1
30 TABLET ORAL DAILY
Qty: 90 TABLET | Refills: 3 | Status: SHIPPED | OUTPATIENT
Start: 2025-08-30

## 2025-09-03 ENCOUNTER — HOSPITAL ENCOUNTER (OUTPATIENT)
Dept: CARDIOLOGY | Facility: CLINIC | Age: 30
Discharge: HOME | End: 2025-09-03
Attending: STUDENT IN AN ORGANIZED HEALTH CARE EDUCATION/TRAINING PROGRAM
Payer: MEDICARE

## 2025-09-03 ENCOUNTER — ANCILLARY PROCEDURE (OUTPATIENT)
Dept: CARDIOLOGY | Facility: CLINIC | Age: 30
End: 2025-09-03
Attending: STUDENT IN AN ORGANIZED HEALTH CARE EDUCATION/TRAINING PROGRAM
Payer: MEDICARE

## 2025-09-03 VITALS — WEIGHT: 126.1 LBS | BODY MASS INDEX: 20.71 KG/M2

## 2025-09-03 DIAGNOSIS — Q23.4 HLHS (HYPOPLASTIC LEFT HEART SYNDROME): ICD-10-CM

## 2025-09-03 DIAGNOSIS — R74.8 ABNORMAL LEVELS OF OTHER SERUM ENZYMES: ICD-10-CM

## 2025-09-03 DIAGNOSIS — I50.22 CHRONIC SYSTOLIC HEART FAILURE (H): ICD-10-CM

## 2025-09-03 DIAGNOSIS — Z87.74 S/P FONTAN PROCEDURE: ICD-10-CM

## 2025-09-03 DIAGNOSIS — Q23.4 HYPOPLASTIC LEFT HEART SYNDROME: ICD-10-CM

## 2025-09-03 PROCEDURE — 94621 CARDIOPULM EXERCISE TESTING: CPT

## 2025-09-03 PROCEDURE — 93303 ECHO TRANSTHORACIC: CPT | Performed by: PEDIATRICS

## 2025-09-03 PROCEDURE — 93325 DOPPLER ECHO COLOR FLOW MAPG: CPT | Performed by: PEDIATRICS

## 2025-09-03 PROCEDURE — 93320 DOPPLER ECHO COMPLETE: CPT | Performed by: PEDIATRICS

## 2025-09-04 LAB
CARDIOPULMONARY BLOOD PRESSURE REST: NORMAL MMHG
CARDIOPULMONARY BREATHING RESERVE REST: 90.7
CARDIOPULMONARY BREATHING RESERVE V02MAX: 58
CARDIOPULMONARY CO2 OUTPUT REST: 334 ML/MIN
CARDIOPULMONARY CO2 OUTPUT VO2MAX: 1579 ML/MIN
CARDIOPULMONARY FUNCTIONAL CAPACITY MAX ML/KG/MIN: 21.31 ML/KG/MIN
CARDIOPULMONARY FUNCTIONAL CAPACITY PERCENT: 52 %
CARDIOPULMONARY FUNCTIONAL CAPACITY PREDICTED: 40.6 ML/KG/MIN
CARDIOPULMONARY HEART RATE REST: 83 BPM
CARDIOPULMONARY MET'S REST: 1.9
CARDIOPULMONARY MINUTE VENTILATION REST: 12.9 L/MIN
CARDIOPULMONARY MINUTE VENTILATION VO2MAX: 58 L/MIN
CARDIOPULMONARY MYOCARDIAC O2 DEMAND MAX: NORMAL
CARDIOPULMONARY OXYGEN CONSUMPTION REST: 6.8 ML/KG/MIN
CARDIOPULMONARY OXYGEN CONSUMPTION VO2MAX: 21.31 ML/KG/MIN
CARDIOPULMONARY OXYGEN PULSE REST: 5 ML/BEAT
CARDIOPULMONARY OXYGEN PULSE VO2MAX: 9.9 ML/BEAT
CARDIOPULMONARY OXYGEN SATURATION- OXIMETRY REST: 97 %
CARDIOPULMONARY OXYGEN SATURATION- OXIMETRY VO2MAX: 96 %
CARDIOPULMONARY PET C02 REST: 35
CARDIOPULMONARY PET C02 VO2MAX: 30
CARDIOPULMONARY PET02 REST: 106
CARDIOPULMONARY PET02 V02 MAX: 117
CARDIOPULMONARY RER: 0.96
CARDIOPULMONARY RESPIRALORY EXCHANGE RATIO VO2MAX: 0.96
CARDIOPULMONARY RESPIRALORY EXCHANGE RATIO: 0.86
CARDIOPULMONARY RESPIRATORY RATE REST: 17 BR/MIN
CARDIOPULMONARY RESPIRATORY RATE VO2MAX: 49 BR/MIN
CARDIOPULMONARY STRESS BASE 1 BP MMHG: NORMAL MMHG
CARDIOPULMONARY STRESS BASE 1 BPA: 117 BPM
CARDIOPULMONARY STRESS BASE 1 SPO2: 96 % SPO2
CARDIOPULMONARY STRESS BASE 1 TIME SEC: 0 SEC
CARDIOPULMONARY STRESS BASE 1 TIME: 1 MINS
CARDIOPULMONARY STRESS BASE 2 BP MMHG: NORMAL MMHG
CARDIOPULMONARY STRESS BASE 2 BPA: 98 BPM
CARDIOPULMONARY STRESS BASE 2 SPO2: 96 % SPO2
CARDIOPULMONARY STRESS BASE 2 TIME SEC: 0 SEC
CARDIOPULMONARY STRESS BASE 2 TIME: 3 MINS
CARDIOPULMONARY STRESS BASE 3 BP MMHG: NORMAL MMHG
CARDIOPULMONARY STRESS BASE 3 BPA: 89 BPM
CARDIOPULMONARY STRESS BASE 3 SPO2: 97 % SPO2
CARDIOPULMONARY STRESS BASE 3 TIME SEC: 0 SEC
CARDIOPULMONARY STRESS BASE 3 TIME: 5 MINS
CARDIOPULMONARY STRESS PHASE 1 BP MMHG: NORMAL MMHG
CARDIOPULMONARY STRESS PHASE 1 BPM: 94 BPM
CARDIOPULMONARY STRESS PHASE 1 SPO2: 97 % SPO2
CARDIOPULMONARY STRESS PHASE 1 TIME SEC: 0 SEC
CARDIOPULMONARY STRESS PHASE 1 TIME: 2 MINS
CARDIOPULMONARY STRESS PHASE 2 BP MMHG: NORMAL MMHG
CARDIOPULMONARY STRESS PHASE 2 BPM: 98 BPM
CARDIOPULMONARY STRESS PHASE 2 SPO2: 97 % SPO2
CARDIOPULMONARY STRESS PHASE 2 TIME SEC: 0 SEC
CARDIOPULMONARY STRESS PHASE 2 TIME: 4 MINS
CARDIOPULMONARY STRESS PHASE 3 BP MMHG: NORMAL MMHG
CARDIOPULMONARY STRESS PHASE 3 BPM: 104 BPM
CARDIOPULMONARY STRESS PHASE 3 SPO2: 97 % SPO2
CARDIOPULMONARY STRESS PHASE 3 TIME SEC: 0 SEC
CARDIOPULMONARY STRESS PHASE 3 TIME: 6 MINS
CARDIOPULMONARY STRESS PHASE 4 BP MMHG: NORMAL MMHG
CARDIOPULMONARY STRESS PHASE 4 BPM: 118 BPM
CARDIOPULMONARY STRESS PHASE 4 SPO2: 96 % SPO2
CARDIOPULMONARY STRESS PHASE 4 TIME SEC: 0 SEC
CARDIOPULMONARY STRESS PHASE 4 TIME: 8 MINS
CARDIOPULMONARY STRESS PHASE 5 BPM: 123 BPM
CARDIOPULMONARY STRESS PHASE 5 SPO2: 96 % SPO2
CARDIOPULMONARY STRESS PHASE 5 TIME SEC: 27 SEC
CARDIOPULMONARY STRESS PHASE 5 TIME: 8 MINS
CARDIOPULMONARY TIDAL VOLUME REST: 758 ML
CARDIOPULMONARY TIDAL VOLUME VO2MAX: 1184 ML
CARDIOPULMONARY VE/VCO2 SLOPE: 34.74
CARDIOPULMONARY VENTILATORY EQUIVALENT 02 REST: 33
CARDIOPULMONARY VENTILATORY EQUIVALENT 02 V02: 39
CARDIOPULMONARY VENTILATORY EQUIVALENT C02 REST: 39
CARDIOPULMONARY VENTILATORY EQUIVALENT C02 SLOPE VO2MAX: 34.74
CARDIOPULMONARY VENTILATORY EQUIVALENT C02 VO2MAX: 37
CV STRESS MAX HR HE: 123
PREDICTED VO2MAX: 40.6
STRESS ANGINA INDEX: 0
STRESS ECHO BASELINE BP: NORMAL MMHG
STRESS ECHO BASELINE HR: 77 BPM
STRESS ECHO CALCULATED PERCENT HR: 64 %
STRESS ECHO LAST STRESS BP: NORMAL MMHG
STRESS ECHO POST ESTIMATED WORKLOAD: 6.1 METS
STRESS ECHO POST EXERCISE DUR MIN: 8 MIN
STRESS ECHO POST EXERCISE DUR SEC: 27 SEC
STRESS ECHO TARGET HR: 192

## (undated) DEVICE — INTRO SHEATH 6FRX10CM PINNACLE RSS602

## (undated) DEVICE — SHEATH INTRODUCER PRELUDE IDEAL 4FR X 11CM  HYDROPHILIC  ANG

## (undated) DEVICE — WIRE GUIDE AMPLATZ SUPER STIFF 0.035"X260CM STR M001465090

## (undated) DEVICE — GUIDEWIRE ROSEN CVD .035X260CM G01253 THSCF-35-260-1.5-ROSEN

## (undated) DEVICE — CATH INTRO SHEATH 12FRX80CM .038" GW G49487

## (undated) DEVICE — INTRODUCER SHEATH 12FRX12CM FAST-CATH 406128

## (undated) DEVICE — RAD INFLATOR BASIC COMPAK  IN4130

## (undated) DEVICE — 21G X 4CM X 11CM, 7FR, HYDROPHILIC SHEATH INTRODUCER (VERSION B)

## (undated) DEVICE — 5FR X 100CM X 0.035IN PERFORMA PEDIATRIC DIAGNOSTIC CATHETER, 4 SIDE HOLES, PED-BERN-VSC-2

## (undated) DEVICE — KIT HAND CONTROL ANGIOTOUCH ACIST 65CM AT-P65

## (undated) DEVICE — PACK PEDS LEFT HEART CUSTOM SCV15OHRMH

## (undated) DEVICE — CATH PIGTAILS W/MARKERS 5FR 100CM 7602-20M100SH

## (undated) DEVICE — INTRO SHEATH 10FRX10CM PINNACLE RSS002

## (undated) DEVICE — CLOSURE DEVICE 6FR VASC PROGLIDE MEDICATED SUTURE 12673-03

## (undated) DEVICE — MANIFOLD KIT ANGIO AUTOMATED 014613

## (undated) DEVICE — MANIFOLD CUSTOM 2 VALVE H7496021017141

## (undated) DEVICE — Device

## (undated) DEVICE — SYR ANGIOGRAPHY MULTIUSE KIT ACIST 014612

## (undated) DEVICE — WIRE GUIDE 0.035"X150CM EMERALD J TIP 502521

## (undated) DEVICE — CATH ANGIO WEDGE PRESSURE 7FRX110CM DL AI-07127

## (undated) DEVICE — KIT LG BORE TOUHY ACCESS PLUS MAP152

## (undated) DEVICE — GUIDEWIRE TERUMO .035X180 ANG GR3508

## (undated) RX ORDER — IODIXANOL 320 MG/ML
INJECTION, SOLUTION INTRAVASCULAR
Status: DISPENSED
Start: 2022-03-22

## (undated) RX ORDER — NITROGLYCERIN 5 MG/ML
VIAL (ML) INTRAVENOUS
Status: DISPENSED
Start: 2022-03-22

## (undated) RX ORDER — FENTANYL CITRATE 50 UG/ML
INJECTION, SOLUTION INTRAMUSCULAR; INTRAVENOUS
Status: DISPENSED
Start: 2022-03-22

## (undated) RX ORDER — HEPARIN SODIUM 1000 [USP'U]/ML
INJECTION, SOLUTION INTRAVENOUS; SUBCUTANEOUS
Status: DISPENSED
Start: 2022-03-22

## (undated) RX ORDER — LIDOCAINE HYDROCHLORIDE 10 MG/ML
INJECTION, SOLUTION EPIDURAL; INFILTRATION; INTRACAUDAL; PERINEURAL
Status: DISPENSED
Start: 2022-03-22

## (undated) RX ORDER — BUPIVACAINE HYDROCHLORIDE 2.5 MG/ML
INJECTION, SOLUTION EPIDURAL; INFILTRATION; INTRACAUDAL
Status: DISPENSED
Start: 2022-03-22